# Patient Record
Sex: FEMALE | Race: WHITE | URBAN - METROPOLITAN AREA
[De-identification: names, ages, dates, MRNs, and addresses within clinical notes are randomized per-mention and may not be internally consistent; named-entity substitution may affect disease eponyms.]

---

## 2017-05-30 ENCOUNTER — OFFICE VISIT (OUTPATIENT)
Dept: SURGERY | Facility: CLINIC | Age: 72
End: 2017-05-30

## 2017-05-30 VITALS
TEMPERATURE: 98 F | SYSTOLIC BLOOD PRESSURE: 154 MMHG | DIASTOLIC BLOOD PRESSURE: 61 MMHG | WEIGHT: 237.6 LBS | OXYGEN SATURATION: 99 % | HEIGHT: 61 IN | HEART RATE: 68 BPM | BODY MASS INDEX: 44.86 KG/M2

## 2017-05-30 DIAGNOSIS — K62.89 HYPERTROPHY, ANAL PAPILLAE: Primary | ICD-10-CM

## 2017-05-30 DIAGNOSIS — K64.8 INTERNAL HEMORRHOIDS: ICD-10-CM

## 2017-05-30 ASSESSMENT — PAIN SCALES - GENERAL: PAINLEVEL: NO PAIN (0)

## 2017-05-30 NOTE — MR AVS SNAPSHOT
"              After Visit Summary   5/30/2017    Claudette Farrar    MRN: [id number]           Patient Information     Date Of Birth          1945        Visit Information        Provider Department      5/30/2017 10:00 AM eJ Vargas MD [facility] Colon and Rectal Surgery        Today's Diagnoses     Hypertrophy, anal papillae    -  1    Internal hemorrhoids           Follow-ups after your visit        Who to contact     Please call your clinic at [phone] to:    Ask questions about your health    Make or cancel appointments    Discuss your medicines    Learn about your test results    Speak to your doctor   If you have compliments or concerns about an experience at your clinic, or if you wish to file a complaint, please contact [facility] Patient Relations at [phone] or email us at [email or web address]         Additional Information About Your Visit        MyChart Information     [employer] gives you secure access to your electronic health record. If you see a primary care provider, you can also send messages to your care team and make appointments. If you have questions, please call your primary care clinic.  If you do not have a primary care provider, please call [phone] and they will assist you.      [employer] is an electronic gateway that provides easy, online access to your medical records. With [employer], you can request a clinic appointment, read your test results, renew a prescription or communicate with your care team.     To access your existing account, please contact your [facility] or call [phone] for assistance.        Care EveryWhere ID     This is your Care EveryWhere ID. This could be used by other organizations to access your [place] medical records  [id number]        Your Vitals Were     Pulse Temperature Height Pulse Oximetry BMI (Body Mass Index)       68 98  F (36.7  C) (Oral) 1.549 m (5' 1\") 99% 44.89 " "kg/m2        Blood Pressure from Last 3 Encounters:   No data found for BP    Weight from Last 3 Encounters:   No data found for Wt              Today, you had the following     No orders found for display       Primary Care Provider Office Phone # Fax #    Deonte Gooden [phone]       [facility] [address] [facility] [address]        Thank you!     Thank you for choosing [facility] COLON AND RECTAL SURGERY  for your care. Our goal is always to provide you with excellent care. Hearing back from our patients is one way we can continue to improve our services. Please take a few minutes to complete the written survey that you may receive in the mail after your visit with us. Thank you!             Your Updated Medication List - Protect others around you: Learn how to safely use, store and throw away your medicines at www.disposemymeds.org.          This list is accurate as of: 5/30/17 11:59 PM.  Always use your most recent med list.                   Brand Name Dispense Instructions for use    bisacodyl 5 MG EC tablet    DULCOLAX     Take 5 mg by mouth daily as needed for constipation       blood glucose monitoring lancets      1 each by In Vitro route Use to test blood sugar 3 times daily or as directed.       DULOXETINE HCL PO      Take 60 mg by mouth       ESOMEPRAZOLE MAGNESIUM PO      Take 40 mg by mouth       FERROUS GLUCONATE PO      Take 325 mg by mouth       fluticasone 50 MCG/ACT spray    FLONASE     Spray 2 sprays into both nostrils daily       FOLIC ACID PO      Take 400 mcg by mouth daily       FUROSEMIDE PO      Take 20 mg by mouth       hydrocortisone 2.5 % cream      Apply topically 2 times daily       insulin glargine 100 UNIT/ML injection    LANTUS     Inject Subcutaneous At Bedtime       LOSARTAN POTASSIUM PO      Take 50 mg by mouth       magnesium citrate solution     296 mL    Take 296 mLs by mouth See Admin Instructions Refer to \"Getting Ready for a " "Colonoscopy\" patient handout.       METHOTREXATE PO      Take 2.5 mg by mouth       metoprolol-hydrochlorothiazide 25-12.5 MG Tb24 per tablet    DUTOPROL     Take 1 tablet by mouth daily       NONFORMULARY          oxybutynin 10 MG 24 hr tablet    DITROPAN-XL     Take 10 mg by mouth daily       oxyCODONE-acetaminophen 5-325 MG per tablet    PERCOCET     Take by mouth every 4 hours as needed for moderate to severe pain       polyethylene glycol powder    MIRALAX/GLYCOLAX     Take 1 capful by mouth daily       potassium gluconate 2.5 MEQ Tabs          PRAVASTATIN SODIUM PO      Take 80 mg by mouth       SUMATRIPTAN SUCCINATE PO      Take 25 mg by mouth       tofacitinib 5 MG tablet    XELJANZ     Take 5 mg by mouth 2 times daily       * UNABLE TO FIND      MEDICATION NAME: Pen needle, diabetic 31 gauge x 5/16\"       * UNABLE TO FIND      MEDICATION NAME: Truetest test strips       * UNABLE TO FIND      MEDICATION NAME: Blood-glucose meter       vitamin D 67653 UNIT capsule    ERGOCALCIFEROL     Take 50,000 Units by mouth       * Notice:  This list has 3 medication(s) that are the same as other medications prescribed for you. Read the directions carefully, and ask your doctor or other care provider to review them with you.      "

## 2017-05-30 NOTE — NURSING NOTE
Xylocaine with epinephrine 1%.   Wasted 10ml.  Elmira Marks LPN  May 30, 2017

## 2017-05-30 NOTE — LETTER
"June 1, 2017    Bela Perdomo  [address] [facility] [address]    Dear Bela,    The pathology from your recent biopsy performed in clinic showed benign (non-cancerous) tissue.    If you should need further hemorrhoid banding, please call our office to schedule at [phone],  option 1.    Sincerely,    Sade Ureña, RN, BSN  RN Care Coordinator  Colon and Rectal Surgery Clinic  [facility]  [phone], option #3          Resulted Orders   Surgical pathology exam   Result Value Ref Range    Copath Report       Patient Name: BELA PERDOMO  MR#: [id number]  Specimen #: [id number]  Collected: 5/30/2017  Received: 5/30/2017  Reported: 5/31/2017 13:08  Ordering Phy(s): FLAKO BISHOP    For improved result formatting, select 'View Enhanced Report Format'  under Linked Documents section.    SPECIMEN(S):  Anal biopsy    FINAL DIAGNOSIS:  Anus, biopsy:  - Hypertrophied anal papilla with hyperkeratosis  - Negative for dysplasia and malignancy    I have personally reviewed all specimens and or slides, including the  listed special stains, and used them with my medical judgement to  determine the final diagnosis.    Electronically signed out by:    Ki Bradley M.D    CLINICAL HISTORY:    Hemorrhoids    GROSS:  The specimen is received in formalin with proper patient identification,  labeled \"anal papilla\", and consists of a 1.0 x 0.8 x 0.6 cm nodule-like  portion of pale tan-pink, slightly rubbery soft tissue with a 0.8 x 0.6  cm and slightly ragged and cauterized the margin of resection.  The  roula n is inked black, and the specimen is bisected, wrapped, and  entirely submitted in one cassette. (Dictated by: Zahra Marr 5/30/2017  11:49 AM)    MICROSCOPIC:   Microscopic exam was performed    CPT Codes:  A: 04839-SF6    TESTING LAB LOCATION:  [facility], [address]   [address] [phone]    COLLECTION " SITE:  Client: [facility]  Location: [facility] (B)

## 2017-05-30 NOTE — LETTER
2017       RE: Claudette Farrar  [address] [facility] [address]     Dear Colleague,    Thank you for referring your patient, Claudette Farrar, to the [facility] COLON AND RECTAL SURGERY at [facility]. Please see a copy of my visit note below.    Colon and Rectal Surgery Clinic Note      RE: Claudette Farrar  : 1945  ELROY: 2017      HISTORY OF PRESENT ILLNESS:  Claudette returns today for followup of her hemorrhoids and hypertrophied anal papilla.  She has bled to iron deficiency anemia.  I colonoscoped her on 2016 and found only a lymphoid follicle but no adenomas or other pathology.  I have banded Claudette in the past, and she has had an excellent response.  She is not sure if she has any further anal protrusion.  She more recently has had some anal bleeding that is relatively less in quantity than she had previously.  She also notes the prolapsing hypertrophied anal papilla.      PHYSICAL EXAMINATION:  Perianal examination demonstrates mixed prolapsed hemorrhoids.  There is an anterior prolapsed hypertrophied anal papilla.  Digital rectal examination shows no masses.  Anoscopy confirms enlarged internal hemorrhoids with associated mucosal prolapse, most prominent posteriorly and to the right.      I obtained written informed consent.  I anesthetized the base of the hypertrophied papilla with 1% Xylocaine with epinephrine.  I excised the papilla using electrocautery and oversewed the base with a 4-0 Vicryl figure-of-8 stitch.  I next applied a rubber band to the right posterolateral internal hemorrhoid.  I only had a moderate quantity of excess hemorrhoidal tissue, but given the span in the HAP excision, there was not really room to add an additional band comfortably.      PLAN:  Claudette will see how things go with today's banding and HAP removal.  If she has ongoing symptoms, she can follow up in clinic for repeat banding.      I answered all of Claudette's questions to her  "satisfaction.  She expressed understanding and agreement with the proposed plan.      cc:   Deonte Gooden MD   [facility]   [address] [place], [place] [id number]     For details of past medical history, surgical history, family history, medications, allergies, and review of systems, please see details below.    Medical history:  Past Medical History:   Diagnosis Date     Anemia      Bleeding hemorrhoid      Diabetes mellitus type 2 in nonobese (H)      GERD (gastroesophageal reflux disease)      Hypertension      Multiple thyroid nodules      Osteopenia      Rheumatoid arthritis (H)        Surgical history:  Past Surgical History:   Procedure Laterality Date     ANKLE SURGERY  2/1989    Fracture     DECOMPRESSION LUMBAR ONE LEVEL  12/2005    Infection after surgery     Foot surgery (joint)  11/2011     SINUS SURGERY  06/1993     Total Knee Replacement (right)  11/12/14       Family history:  Family History   Problem Relation Age of Onset     Anesthesia Reaction Paternal Grandmother      Colon Polyps Father      Crohn Disease No family hx of      Ulcerative Colitis Father      Colon Cancer No family hx of      Other Cancer Paternal Aunt      Other - See Comments Father      Ileostomy        Medications:  Current Outpatient Prescriptions   Medication Sig Dispense Refill     magnesium citrate solution Take 296 mLs by mouth See Admin Instructions Refer to \"Getting Ready for a Colonoscopy\" patient handout. 296 mL 0     UNABLE TO FIND MEDICATION NAME: Blood-glucose meter       bisacodyl (DULCOLAX) 5 MG EC tablet Take 5 mg by mouth daily as needed for constipation       DULOXETINE HCL PO Take 60 mg by mouth       vitamin D (ERGOCALCIFEROL) 20858 UNIT capsule Take 50,000 Units by mouth       ESOMEPRAZOLE MAGNESIUM PO Take 40 mg by mouth       FERROUS GLUCONATE PO Take 325 mg by mouth       fluticasone (FLONASE) 50 MCG/ACT nasal spray Spray 2 sprays into both nostrils daily       FOLIC ACID PO Take 400 mcg by " "mouth daily       FUROSEMIDE PO Take 20 mg by mouth       hydrocortisone 2.5 % cream Apply topically 2 times daily       insulin glargine (LANTUS) 100 UNIT/ML vial Inject Subcutaneous At Bedtime       blood glucose monitoring (ULTRA THIN 30G) lancets 1 each by In Vitro route Use to test blood sugar 3 times daily or as directed.       LOSARTAN POTASSIUM PO Take 50 mg by mouth       Methotrexate Sodium (METHOTREXATE PO) Take 2.5 mg by mouth       metoprolol-hydrochlorothiazide (DUTOPROL) 25-12.5 MG TB24 per tablet Take 1 tablet by mouth daily       polyethylene glycol (MIRALAX/GLYCOLAX) powder Take 1 capful by mouth daily       oxybutynin (DITROPAN-XL) 10 MG 24 hr tablet Take 10 mg by mouth daily       oxyCODONE-acetaminophen (PERCOCET) 5-325 MG per tablet Take by mouth every 4 hours as needed for moderate to severe pain       NONFORMULARY        UNABLE TO FIND MEDICATION NAME: Pen needle, diabetic 31 gauge x 5/16\"       potassium gluconate 2.5 MEQ TABS        PRAVASTATIN SODIUM PO Take 80 mg by mouth       SUMATRIPTAN SUCCINATE PO Take 25 mg by mouth       UNABLE TO FIND MEDICATION NAME: Truetest test strips       tofacitinib (XELJANZ) 5 MG Take 5 mg by mouth 2 times daily     Allergies:  The patientis allergic to metformin and sulfa drugs.    Social history:  Social History   Substance Use Topics     Smoking status: Never Smoker     Smokeless tobacco: Not on file     Alcohol use 0.0 oz/week     0 Standard drinks or equivalent per week      Comment: Average one drink per month     Marital status: .    Review of Systems:  Nursing Notes:   Elmira Marks LPN  5/30/2017  9:59 AM  Signed  Chief Complaint   Patient presents with     Clinic Care Coordination - Follow-up     F/u per patient to discuss removing anal papilla.        Vitals:    05/30/17 0954   BP: 154/61   BP Location: Left arm   Patient Position: Chair   Cuff Size: Adult Large   Pulse: 68   Temp: 98  F (36.7  C)   TempSrc: Oral   SpO2: 99%   Weight: " "237 lb 9.6 oz   Height: 5' 1\"     Body mass index is 44.89 kg/(m^2).    MIRIAM Be MD   Professor and Chief  Division of Colon and Rectal Surgery  [facility]    Referring Provider:  [place] [address]     Primary Care Provider:  Deonte Gooden    "

## 2017-05-30 NOTE — NURSING NOTE
"Chief Complaint   Patient presents with     Clinic Care Coordination - Follow-up     F/u per patient to discuss removing anal papilla.        Vitals:    05/30/17 0954   BP: 154/61   BP Location: Left arm   Patient Position: Chair   Cuff Size: Adult Large   Pulse: 68   Temp: 98  F (36.7  C)   TempSrc: Oral   SpO2: 99%   Weight: 237 lb 9.6 oz   Height: 5' 1\"       Body mass index is 44.89 kg/(m^2).    You DORADO LPN                        "

## 2017-05-31 LAB — COPATH REPORT: NORMAL

## 2017-06-01 ENCOUNTER — TELEPHONE (OUTPATIENT)
Dept: SURGERY | Facility: CLINIC | Age: 72
End: 2017-06-01

## 2017-06-01 NOTE — TELEPHONE ENCOUNTER
"Voice mail Claudette, calling for Dr. Vargas.  She has questions and problems post papillae removal yesterday.    Will route to Dr. Alicia Stuart's clinic nurse.    I called Claudette back, she reports procedure yesterday.  Today \"swollen ball outside\", irritated but not significant pain.  Want's to make sure she shouldn't be concerned?      "

## 2017-06-01 NOTE — TELEPHONE ENCOUNTER
Contacted patient and she reports feeling swelling in anal area.  Instructed to use warm squirt bottle and frequent sitz baths.  Denies, pain, fever, or chills.  Will call back with any further issues.

## 2017-06-02 ENCOUNTER — CARE COORDINATION (OUTPATIENT)
Dept: SURGERY | Facility: CLINIC | Age: 72
End: 2017-06-02

## 2017-07-18 ENCOUNTER — OFFICE VISIT (OUTPATIENT)
Dept: SURGERY | Facility: CLINIC | Age: 72
End: 2017-07-18

## 2017-07-18 VITALS — WEIGHT: 238.6 LBS | TEMPERATURE: 98.6 F | OXYGEN SATURATION: 97 % | BODY MASS INDEX: 45.05 KG/M2 | HEIGHT: 61 IN

## 2017-07-18 DIAGNOSIS — K64.8 INTERNAL HEMORRHOIDS: Primary | ICD-10-CM

## 2017-07-18 RX ORDER — BUPROPION HYDROCHLORIDE 150 MG/1
TABLET ORAL
COMMUNITY
Start: 2017-06-27

## 2017-07-18 ASSESSMENT — PAIN SCALES - GENERAL: PAINLEVEL: NO PAIN (0)

## 2017-07-18 NOTE — NURSING NOTE
"Chief Complaint   Patient presents with     Clinic Care Coordination - Initial     Clinical f/u for hemorrhoid banding.        Vitals:    07/18/17 1005   BP Location: Left arm   Patient Position: Chair   Cuff Size: Adult Large   Temp: 98.6  F (37  C)   TempSrc: Oral   SpO2: 97%   Weight: 238 lb 9.6 oz   Height: 5' 1\"       Body mass index is 45.08 kg/(m^2).    You DORADO LPN                        "

## 2017-07-18 NOTE — MR AVS SNAPSHOT
"              After Visit Summary   7/18/2017    Claudette Farrar    MRN: [id number]           Patient Information     Date Of Birth          1945        Visit Information        Provider Department      7/18/2017 10:00 AM Je Vargas MD [facility] Colon and Rectal Surgery        Today's Diagnoses     Internal hemorrhoids    -  1       Follow-ups after your visit        Who to contact     Please call your clinic at [phone] to:    Ask questions about your health    Make or cancel appointments    Discuss your medicines    Learn about your test results    Speak to your doctor   If you have compliments or concerns about an experience at your clinic, or if you wish to file a complaint, please contact [facility] Patient Relations at [phone] or email us at [email or web address]         Additional Information About Your Visit        MyChart Information     [employer] gives you secure access to your electronic health record. If you see a primary care provider, you can also send messages to your care team and make appointments. If you have questions, please call your primary care clinic.  If you do not have a primary care provider, please call [phone] and they will assist you.      [employer] is an electronic gateway that provides easy, online access to your medical records. With [employer], you can request a clinic appointment, read your test results, renew a prescription or communicate with your care team.     To access your existing account, please contact your [facility] or call [phone] for assistance.        Care EveryWhere ID     This is your Care EveryWhere ID. This could be used by other organizations to access your [place] medical records  [id number]        Your Vitals Were     Temperature Height Pulse Oximetry BMI (Body Mass Index)          98.6  F (37  C) (Oral) 1.549 m (5' 1\") 97% 45.08 kg/m2         Blood Pressure from Last 3 " Encounters:   No data found for BP    Weight from Last 3 Encounters:   No data found for Wt              Today, you had the following     No orders found for display       Primary Care Provider Office Phone # Fax #    Deonte Gooden [phone]       [facility] [address] [facility] [address]        Equal Access to Services     BRYNNCAROLINA THERON : Hadii aad ku hadasho Soomaali, waaxda luqadaha, qaybta kaalmada adeegyada, waxay idiin hayaan adetasneem khkennethheath lajosselin . So [facility] [phone].    ATENCIÓN: Si habla español, tiene a tate disposición servicios gratuitos de asistencia lingüística. Llame al [phone].    We comply with applicable federal civil rights laws and [place] laws. We do not discriminate on the basis of race, color, national origin, age, disability sex, sexual orientation or gender identity.            Thank you!     Thank you for choosing [facility] COLON AND RECTAL SURGERY  for your care. Our goal is always to provide you with excellent care. Hearing back from our patients is one way we can continue to improve our services. Please take a few minutes to complete the written survey that you may receive in the mail after your visit with us. Thank you!             Your Updated Medication List - Protect others around you: Learn how to safely use, store and throw away your medicines at www.disposemymeds.org.          This list is accurate as of: 7/18/17 11:59 PM.  Always use your most recent med list.                   Brand Name Dispense Instructions for use Diagnosis    bisacodyl 5 MG EC tablet    DULCOLAX     Take 5 mg by mouth daily as needed for constipation        blood glucose monitoring lancets      1 each by In Vitro route Use to test blood sugar 3 times daily or as directed.        buPROPion 150 MG 24 hr tablet    WELLBUTRIN XL     1-2 tablets po Q AM        DULOXETINE HCL PO      Take 60 mg by mouth        ESOMEPRAZOLE MAGNESIUM PO      Take 40 mg by mouth         "FERROUS GLUCONATE PO      Take 325 mg by mouth        fluticasone 50 MCG/ACT spray    FLONASE     Spray 2 sprays into both nostrils daily        FOLIC ACID PO      Take 400 mcg by mouth daily        FUROSEMIDE PO      Take 20 mg by mouth        hydrocortisone 2.5 % cream      Apply topically 2 times daily        insulin glargine 100 UNIT/ML injection    LANTUS     Inject Subcutaneous At Bedtime        LOSARTAN POTASSIUM PO      Take 50 mg by mouth        magnesium citrate solution     296 mL    Take 296 mLs by mouth See Admin Instructions Refer to \"Getting Ready for a Colonoscopy\" patient handout.    Other iron deficiency anemia, Rectal bleeding, Internal hemorrhoids       METHOTREXATE PO      Take 2.5 mg by mouth        metoprolol-hydrochlorothiazide 25-12.5 MG Tb24 per tablet    DUTOPROL     Take 1 tablet by mouth daily        NONFORMULARY           oxybutynin 10 MG 24 hr tablet    DITROPAN-XL     Take 10 mg by mouth daily        oxyCODONE-acetaminophen 5-325 MG per tablet    PERCOCET     Take by mouth every 4 hours as needed for moderate to severe pain        polyethylene glycol powder    MIRALAX/GLYCOLAX     Take 1 capful by mouth daily        potassium gluconate 2.5 MEQ Tabs           PRAVASTATIN SODIUM PO      Take 80 mg by mouth        SUMATRIPTAN SUCCINATE PO      Take 25 mg by mouth        tofacitinib 5 MG tablet    XELJANZ     Take 5 mg by mouth 2 times daily        * UNABLE TO FIND      MEDICATION NAME: Pen needle, diabetic 31 gauge x 5/16\"        * UNABLE TO FIND      MEDICATION NAME: Truetest test strips        * UNABLE TO FIND      MEDICATION NAME: Blood-glucose meter        vitamin D 94402 UNIT capsule    ERGOCALCIFEROL     Take 50,000 Units by mouth        * Notice:  This list has 3 medication(s) that are the same as other medications prescribed for you. Read the directions carefully, and ask your doctor or other care provider to review them with you.      "

## 2017-07-18 NOTE — LETTER
2017       RE: Claudette Farrar  [address] [facility] [address]     Dear Colleague,    Thank you for referring your patient, Claudette Farrar, to the [facility] COLON AND RECTAL SURGERY at [facility]. Please see a copy of my visit note below.    Colon and Rectal Surgery Clinic Note    RE: Claudette Farrar  : 1945  ELROY: 2017      Claudette returns to clinic today with recurrent rectal bleeding. I recently moved a hypertrophied anal papilla in Claudette recovered well from this. However, in the past few days she has had recurrent rather copious rectal bleeding. Her most recent colonoscopy was in 2016.    Physical examination shows mixed hemorrhoids. Digital rectal examination is normal. Anoscopy shows a scar from the papilla excision and moderately enlarged internal hemorrhoids, maximal posteriorly. I obtained written informed consent and performed suction band ligation posteriorly. Jose tolerated the procedure well.    We reviewed post-banding care, which Claudette is well familiar with. She'll follow-up in a month if she has persisting bleeding, or as needed in the future if her bleeding recurs.    Total time 15 minutes. Greater than half the time spent counseling.    For details of past medical history, surgical history, family history, medications, allergies, and review of systems, please see details below.    Medical history:  Past Medical History:   Diagnosis Date     Anemia      Bleeding hemorrhoid      Diabetes mellitus type 2 in nonobese (H)      GERD (gastroesophageal reflux disease)      Hypertension      Multiple thyroid nodules      Osteopenia      Rheumatoid arthritis (H)        Surgical history:  Past Surgical History:   Procedure Laterality Date     ANKLE SURGERY  1989    Fracture     DECOMPRESSION LUMBAR ONE LEVEL  2005    Infection after surgery     Foot surgery (joint)  2011     SINUS SURGERY  1993     Total Knee Replacement (right)  14       Family  "history:  Family History   Problem Relation Age of Onset     Anesthesia Reaction Paternal Grandmother      Colon Polyps Father      Crohn Disease No family hx of      Ulcerative Colitis Father      Colon Cancer No family hx of      Other Cancer Paternal Aunt      Other - See Comments Father      Ileostomy        Medications:  Current Outpatient Prescriptions   Medication Sig Dispense Refill     buPROPion (WELLBUTRIN XL) 150 MG 24 hr tablet 1-2 tablets po Q AM       magnesium citrate solution Take 296 mLs by mouth See Admin Instructions Refer to \"Getting Ready for a Colonoscopy\" patient handout. 296 mL 0     UNABLE TO FIND MEDICATION NAME: Blood-glucose meter       bisacodyl (DULCOLAX) 5 MG EC tablet Take 5 mg by mouth daily as needed for constipation       DULOXETINE HCL PO Take 60 mg by mouth       vitamin D (ERGOCALCIFEROL) 85246 UNIT capsule Take 50,000 Units by mouth       ESOMEPRAZOLE MAGNESIUM PO Take 40 mg by mouth       FERROUS GLUCONATE PO Take 325 mg by mouth       fluticasone (FLONASE) 50 MCG/ACT nasal spray Spray 2 sprays into both nostrils daily       FOLIC ACID PO Take 400 mcg by mouth daily       FUROSEMIDE PO Take 20 mg by mouth       hydrocortisone 2.5 % cream Apply topically 2 times daily       insulin glargine (LANTUS) 100 UNIT/ML vial Inject Subcutaneous At Bedtime       blood glucose monitoring (ULTRA THIN 30G) lancets 1 each by In Vitro route Use to test blood sugar 3 times daily or as directed.       LOSARTAN POTASSIUM PO Take 50 mg by mouth       Methotrexate Sodium (METHOTREXATE PO) Take 2.5 mg by mouth       metoprolol-hydrochlorothiazide (DUTOPROL) 25-12.5 MG TB24 per tablet Take 1 tablet by mouth daily       polyethylene glycol (MIRALAX/GLYCOLAX) powder Take 1 capful by mouth daily       oxybutynin (DITROPAN-XL) 10 MG 24 hr tablet Take 10 mg by mouth daily       oxyCODONE-acetaminophen (PERCOCET) 5-325 MG per tablet Take by mouth every 4 hours as needed for moderate to severe pain       " "NONFORMULARY        UNABLE TO FIND MEDICATION NAME: Pen needle, diabetic 31 gauge x 5/16\"       potassium gluconate 2.5 MEQ TABS        PRAVASTATIN SODIUM PO Take 80 mg by mouth       SUMATRIPTAN SUCCINATE PO Take 25 mg by mouth       UNABLE TO FIND MEDICATION NAME: Truetest test strips       tofacitinib (XELJANZ) 5 MG Take 5 mg by mouth 2 times daily       Allergies:  The patientis allergic to metformin and sulfa drugs.    Social history:  Social History   Substance Use Topics     Smoking status: Never Smoker     Smokeless tobacco: Not on file     Alcohol use 0.0 oz/week     0 Standard drinks or equivalent per week      Comment: Average one drink per month     Marital status: .    Review of Systems:  Nursing Notes:   Elmira Marks LPN  7/18/2017 10:10 AM  Signed  Chief Complaint   Patient presents with     Clinic Care Coordination - Initial     Clinical f/u for hemorrhoid banding.        Vitals:    07/18/17 1005   BP Location: Left arm   Patient Position: Chair   Cuff Size: Adult Large   Temp: 98.6  F (37  C)   TempSrc: Oral   SpO2: 97%   Weight: 238 lb 9.6 oz   Height: 5' 1\"       Body mass index is 45.08 kg/(m^2).    You DORADO LPN     Referring [place] [address]     Primary Care Provider:  Deonte Gooden    Again, thank you for allowing me to participate in the care of your patient.      Sincerely,    Je Vargas MD      "

## 2017-07-18 NOTE — PROGRESS NOTES
Colon and Rectal Surgery Clinic Note      RE: Claudette Farrar  : 1945  ELROY: 2017      Claudette returns to clinic today with recurrent rectal bleeding. I recently moved a hypertrophied anal papilla in Claudette recovered well from this. However, in the past few days she has had recurrent rather copious rectal bleeding. Her most recent colonoscopy was in .    Physical examination shows mixed hemorrhoids. Digital rectal examination is normal. Anoscopy shows a scar from the papilla excision and moderately enlarged internal hemorrhoids, maximal posteriorly. I obtained written informed consent and performed suction band ligation posteriorly. Jose tolerated the procedure well.    We reviewed post-banding care, which Claudette is well familiar with. She'll follow-up in a month if she has persisting bleeding, or as needed in the future if her bleeding recurs.    Total time 15 minutes. Greater than half the time spent counseling.      For details of past medical history, surgical history, family history, medications, allergies, and review of systems, please see details below.    Medical history:  Past Medical History:   Diagnosis Date     Anemia      Bleeding hemorrhoid      Diabetes mellitus type 2 in nonobese (H)      GERD (gastroesophageal reflux disease)      Hypertension      Multiple thyroid nodules      Osteopenia      Rheumatoid arthritis (H)        Surgical history:  Past Surgical History:   Procedure Laterality Date     ANKLE SURGERY  1989    Fracture     DECOMPRESSION LUMBAR ONE LEVEL  2005    Infection after surgery     Foot surgery (joint)  2011     SINUS SURGERY  1993     Total Knee Replacement (right)  16       Family history:  Family History   Problem Relation Age of Onset     Anesthesia Reaction Paternal Grandmother      Colon Polyps Father      Crohn Disease No family hx of      Ulcerative Colitis Father      Colon Cancer No family hx of      Other Cancer Paternal Aunt      Other - See  "Comments Father      Ileostomy        Medications:  Current Outpatient Prescriptions   Medication Sig Dispense Refill     buPROPion (WELLBUTRIN XL) 150 MG 24 hr tablet 1-2 tablets po Q AM       magnesium citrate solution Take 296 mLs by mouth See Admin Instructions Refer to \"Getting Ready for a Colonoscopy\" patient handout. 296 mL 0     UNABLE TO FIND MEDICATION NAME: Blood-glucose meter       bisacodyl (DULCOLAX) 5 MG EC tablet Take 5 mg by mouth daily as needed for constipation       DULOXETINE HCL PO Take 60 mg by mouth       vitamin D (ERGOCALCIFEROL) 49340 UNIT capsule Take 50,000 Units by mouth       ESOMEPRAZOLE MAGNESIUM PO Take 40 mg by mouth       FERROUS GLUCONATE PO Take 325 mg by mouth       fluticasone (FLONASE) 50 MCG/ACT nasal spray Spray 2 sprays into both nostrils daily       FOLIC ACID PO Take 400 mcg by mouth daily       FUROSEMIDE PO Take 20 mg by mouth       hydrocortisone 2.5 % cream Apply topically 2 times daily       insulin glargine (LANTUS) 100 UNIT/ML vial Inject Subcutaneous At Bedtime       blood glucose monitoring (ULTRA THIN 30G) lancets 1 each by In Vitro route Use to test blood sugar 3 times daily or as directed.       LOSARTAN POTASSIUM PO Take 50 mg by mouth       Methotrexate Sodium (METHOTREXATE PO) Take 2.5 mg by mouth       metoprolol-hydrochlorothiazide (DUTOPROL) 25-12.5 MG TB24 per tablet Take 1 tablet by mouth daily       polyethylene glycol (MIRALAX/GLYCOLAX) powder Take 1 capful by mouth daily       oxybutynin (DITROPAN-XL) 10 MG 24 hr tablet Take 10 mg by mouth daily       oxyCODONE-acetaminophen (PERCOCET) 5-325 MG per tablet Take by mouth every 4 hours as needed for moderate to severe pain       NONFORMULARY        UNABLE TO FIND MEDICATION NAME: Pen needle, diabetic 31 gauge x 5/16\"       potassium gluconate 2.5 MEQ TABS        PRAVASTATIN SODIUM PO Take 80 mg by mouth       SUMATRIPTAN SUCCINATE PO Take 25 mg by mouth       UNABLE TO FIND MEDICATION NAME: Truetest " "test strips       tofacitinib (XELJANZ) 5 MG Take 5 mg by mouth 2 times daily       Allergies:  The patientis allergic to metformin and sulfa drugs.    Social history:  Social History   Substance Use Topics     Smoking status: Never Smoker     Smokeless tobacco: Not on file     Alcohol use 0.0 oz/week     0 Standard drinks or equivalent per week      Comment: Average one drink per month     Marital status: .    Review of Systems:  Nursing Notes:   Elmira Marks LPN  7/18/2017 10:10 AM  Signed  Chief Complaint   Patient presents with     Clinic Care Coordination - Initial     Clinical f/u for hemorrhoid banding.        Vitals:    07/18/17 1005   BP Location: Left arm   Patient Position: Chair   Cuff Size: Adult Large   Temp: 98.6  F (37  C)   TempSrc: Oral   SpO2: 97%   Weight: 238 lb 9.6 oz   Height: 5' 1\"       Body mass index is 45.08 kg/(m^2).    MIRIAM Be MD   Professor and Chief  Division of Colon and Rectal Surgery  [facility]      Referring Provider:  [place] [address]     Primary Care Provider:  Deonte Gooden  "

## 2019-10-03 ENCOUNTER — HEALTH MAINTENANCE LETTER (OUTPATIENT)
Age: 74
End: 2019-10-03

## 2019-12-16 ENCOUNTER — HEALTH MAINTENANCE LETTER (OUTPATIENT)
Age: 74
End: 2019-12-16

## 2021-01-15 ENCOUNTER — HEALTH MAINTENANCE LETTER (OUTPATIENT)
Age: 76
End: 2021-01-15

## 2021-09-05 ENCOUNTER — HEALTH MAINTENANCE LETTER (OUTPATIENT)
Age: 76
End: 2021-09-05

## 2021-10-31 ENCOUNTER — HEALTH MAINTENANCE LETTER (OUTPATIENT)
Age: 76
End: 2021-10-31

## 2022-02-20 ENCOUNTER — HEALTH MAINTENANCE LETTER (OUTPATIENT)
Age: 77
End: 2022-02-20

## 2022-08-26 NOTE — PROGRESS NOTES
Colon and Rectal Surgery Clinic Note      RE: Claudette Farrar  : 1945  ELROY: 2017      HISTORY OF PRESENT ILLNESS:  Claudette returns today for followup of her hemorrhoids and hypertrophied anal papilla.  She has bled to iron deficiency anemia.  I colonoscoped her on 2016 and found only a lymphoid follicle but no adenomas or other pathology.  I have banded Claudette in the past, and she has had an excellent response.  She is not sure if she has any further anal protrusion.  She more recently has had some anal bleeding that is relatively less in quantity than she had previously.  She also notes the prolapsing hypertrophied anal papilla.      PHYSICAL EXAMINATION:  Perianal examination demonstrates mixed prolapsed hemorrhoids.  There is an anterior prolapsed hypertrophied anal papilla.  Digital rectal examination shows no masses.  Anoscopy confirms enlarged internal hemorrhoids with associated mucosal prolapse, most prominent posteriorly and to the right.      I obtained written informed consent.  I anesthetized the base of the hypertrophied papilla with 1% Xylocaine with epinephrine.  I excised the papilla using electrocautery and oversewed the base with a 4-0 Vicryl figure-of-8 stitch.  I next applied a rubber band to the right posterolateral internal hemorrhoid.  I only had a moderate quantity of excess hemorrhoidal tissue, but given the span in the HAP excision, there was not really room to add an additional band comfortably.      PLAN:  Claudette will see how things go with today's banding and HAP removal.  If she has ongoing symptoms, she can follow up in clinic for repeat banding.      I answered all of Claudette's questions to her satisfaction.  She expressed understanding and agreement with the proposed plan.      cc:   Deonte Gooden MD   [address]  [address]           For details of past medical history, surgical history, family history, medications, allergies, and  "review of systems, please see details below.    Medical history:  Past Medical History:   Diagnosis Date     Anemia      Bleeding hemorrhoid      Diabetes mellitus type 2 in nonobese (H)      GERD (gastroesophageal reflux disease)      Hypertension      Multiple thyroid nodules      Osteopenia      Rheumatoid arthritis (H)        Surgical history:  Past Surgical History:   Procedure Laterality Date     ANKLE SURGERY  2/1989    Fracture     DECOMPRESSION LUMBAR ONE LEVEL  12/2005    Infection after surgery     Foot surgery (joint)  11/2011     SINUS SURGERY  06/1993     Total Knee Replacement (right)  11/12/14       Family history:  Family History   Problem Relation Age of Onset     Anesthesia Reaction Paternal Grandmother      Colon Polyps Father      Crohn Disease No family hx of      Ulcerative Colitis Father      Colon Cancer No family hx of      Other Cancer Paternal Aunt      Other - See Comments Father      Ileostomy        Medications:  Current Outpatient Prescriptions   Medication Sig Dispense Refill     magnesium citrate solution Take 296 mLs by mouth See Admin Instructions Refer to \"Getting Ready for a Colonoscopy\" patient handout. 296 mL 0     UNABLE TO FIND MEDICATION NAME: Blood-glucose meter       bisacodyl (DULCOLAX) 5 MG EC tablet Take 5 mg by mouth daily as needed for constipation       DULOXETINE HCL PO Take 60 mg by mouth       vitamin D (ERGOCALCIFEROL) 58231 UNIT capsule Take 50,000 Units by mouth       ESOMEPRAZOLE MAGNESIUM PO Take 40 mg by mouth       FERROUS GLUCONATE PO Take 325 mg by mouth       fluticasone (FLONASE) 50 MCG/ACT nasal spray Spray 2 sprays into both nostrils daily       FOLIC ACID PO Take 400 mcg by mouth daily       FUROSEMIDE PO Take 20 mg by mouth       hydrocortisone 2.5 % cream Apply topically 2 times daily       insulin glargine (LANTUS) 100 UNIT/ML vial Inject Subcutaneous At Bedtime       blood glucose monitoring (ULTRA THIN 30G) lancets 1 each by In Vitro route Use " "to test blood sugar 3 times daily or as directed.       LOSARTAN POTASSIUM PO Take 50 mg by mouth       Methotrexate Sodium (METHOTREXATE PO) Take 2.5 mg by mouth       metoprolol-hydrochlorothiazide (DUTOPROL) 25-12.5 MG TB24 per tablet Take 1 tablet by mouth daily       polyethylene glycol (MIRALAX/GLYCOLAX) powder Take 1 capful by mouth daily       oxybutynin (DITROPAN-XL) 10 MG 24 hr tablet Take 10 mg by mouth daily       oxyCODONE-acetaminophen (PERCOCET) 5-325 MG per tablet Take by mouth every 4 hours as needed for moderate to severe pain       NONFORMULARY        UNABLE TO FIND MEDICATION NAME: Pen needle, diabetic 31 gauge x 5/16\"       potassium gluconate 2.5 MEQ TABS        PRAVASTATIN SODIUM PO Take 80 mg by mouth       SUMATRIPTAN SUCCINATE PO Take 25 mg by mouth       UNABLE TO FIND MEDICATION NAME: Truetest test strips       tofacitinib (XELJANZ) 5 MG Take 5 mg by mouth 2 times daily       Allergies:  The patientis allergic to metformin and sulfa drugs.    Social history:  Social History   Substance Use Topics     Smoking status: Never Smoker     Smokeless tobacco: Not on file     Alcohol use 0.0 oz/week     0 Standard drinks or equivalent per week      Comment: Average one drink per month     Marital status: .    Review of Systems:  Nursing Notes:   Elmira Marks LPN  5/30/2017  9:59 AM  Signed  Chief Complaint   Patient presents with     Clinic Care Coordination - Follow-up     F/u per patient to discuss removing anal papilla.        Vitals:    05/30/17 0954   BP: 154/61   BP Location: Left arm   Patient Position: Chair   Cuff Size: Adult Large   Pulse: 68   Temp: 98  F (36.7  C)   TempSrc: Oral   SpO2: 99%   Weight: 237 lb 9.6 oz   Height: 5' 1\"       Body mass index is 44.89 kg/(m^2).    MIRIAM Be MD   Professor and Chief  Division of Colon and Rectal Surgery  [facility]      Referring Provider:  Deonte Andino " MD [place]  [facility]  [address] [place] [address]     Primary Care Provider:  Deonte Gooden   Patient Specific Counseling (Will Not Stick From Patient To Patient): Advised to follow up with vein specialist

## 2022-10-23 ENCOUNTER — HEALTH MAINTENANCE LETTER (OUTPATIENT)
Age: 77
End: 2022-10-23

## 2023-04-02 ENCOUNTER — HEALTH MAINTENANCE LETTER (OUTPATIENT)
Age: 78
End: 2023-04-02

## 2023-04-29 ENCOUNTER — TELEPHONE (OUTPATIENT)
Dept: PHARMACY | Facility: HOSPITAL | Age: 78
End: 2023-04-29
Payer: COMMERCIAL

## 2023-04-29 NOTE — TELEPHONE ENCOUNTER
Payor: [employer] [place]  Reason For Call: Adherence  Medication: Atorvastatin 40 mg   Outcome: Unable to Reach    ---  Sharon Pozo PharmD, MICHELLE  Pharmacy Resident, [facility]

## 2023-08-17 ENCOUNTER — TELEPHONE (OUTPATIENT)
Dept: PRIMARY CARE | Facility: CLINIC | Age: 78
End: 2023-08-17
Payer: COMMERCIAL

## 2023-08-18 DIAGNOSIS — I10 HYPERTENSION, UNSPECIFIED TYPE: Primary | ICD-10-CM

## 2023-08-18 RX ORDER — AMLODIPINE BESYLATE 10 MG/1
10 TABLET ORAL DAILY
COMMUNITY
End: 2023-08-24 | Stop reason: SDUPTHER

## 2023-08-23 NOTE — TELEPHONE ENCOUNTER
Rx Refill Request Telephone Encounter    Name:  John Deutsch  :  [id number]  Medication Name:  Almodipine            Specific Pharmacy location:  [facility]  Date of last appointment:  2023  Date of next appointment:  10/9/2023  Best number to reach patient:  [phone]

## 2023-08-24 DIAGNOSIS — I10 PRIMARY HYPERTENSION: Primary | ICD-10-CM

## 2023-08-24 RX ORDER — AMLODIPINE BESYLATE 10 MG/1
10 TABLET ORAL DAILY
Qty: 30 TABLET | Refills: 0 | Status: SHIPPED | OUTPATIENT
Start: 2023-08-24 | End: 2023-10-04 | Stop reason: SDUPTHER

## 2023-10-04 DIAGNOSIS — I10 PRIMARY HYPERTENSION: ICD-10-CM

## 2023-10-04 RX ORDER — AMLODIPINE BESYLATE 10 MG/1
10 TABLET ORAL DAILY
Qty: 30 TABLET | Refills: 1 | Status: SHIPPED | OUTPATIENT
Start: 2023-10-04 | End: 2023-10-09 | Stop reason: SDUPTHER

## 2023-10-04 NOTE — PROGRESS NOTES
Formally seen by Dr. Cosme. Medication refill for amlodipine for 2 months. Patient will need to establish care with new provider.

## 2023-10-09 ENCOUNTER — OFFICE VISIT (OUTPATIENT)
Dept: PRIMARY CARE | Facility: CLINIC | Age: 78
End: 2023-10-09
Payer: COMMERCIAL

## 2023-10-09 VITALS
OXYGEN SATURATION: 96 % | DIASTOLIC BLOOD PRESSURE: 89 MMHG | HEIGHT: 59 IN | HEART RATE: 88 BPM | RESPIRATION RATE: 16 BRPM | SYSTOLIC BLOOD PRESSURE: 160 MMHG | BODY MASS INDEX: 30.99 KG/M2 | TEMPERATURE: 96.2 F | WEIGHT: 153.7 LBS

## 2023-10-09 DIAGNOSIS — Z76.89 ESTABLISHING CARE WITH NEW DOCTOR, ENCOUNTER FOR: ICD-10-CM

## 2023-10-09 DIAGNOSIS — K21.9 GASTROESOPHAGEAL REFLUX DISEASE WITHOUT ESOPHAGITIS: ICD-10-CM

## 2023-10-09 DIAGNOSIS — I10 PRIMARY HYPERTENSION: ICD-10-CM

## 2023-10-09 DIAGNOSIS — M25.552 BILATERAL HIP PAIN: Primary | ICD-10-CM

## 2023-10-09 DIAGNOSIS — M25.551 BILATERAL HIP PAIN: Primary | ICD-10-CM

## 2023-10-09 DIAGNOSIS — B35.1 NAIL FUNGUS: ICD-10-CM

## 2023-10-09 PROCEDURE — 3079F DIAST BP 80-89 MM HG: CPT

## 2023-10-09 PROCEDURE — 3077F SYST BP >= 140 MM HG: CPT

## 2023-10-09 PROCEDURE — 99213 OFFICE O/P EST LOW 20 MIN: CPT

## 2023-10-09 PROCEDURE — 1036F TOBACCO NON-USER: CPT

## 2023-10-09 PROCEDURE — 1159F MED LIST DOCD IN RCRD: CPT

## 2023-10-09 PROCEDURE — 1125F AMNT PAIN NOTED PAIN PRSNT: CPT

## 2023-10-09 RX ORDER — PANTOPRAZOLE SODIUM 40 MG/1
40 TABLET, DELAYED RELEASE ORAL DAILY
Qty: 90 TABLET | Refills: 3 | Status: SHIPPED | OUTPATIENT
Start: 2023-10-09 | End: 2024-10-08

## 2023-10-09 RX ORDER — ACETAMINOPHEN 500 MG
TABLET ORAL
COMMUNITY
Start: 2017-10-04

## 2023-10-09 RX ORDER — AMLODIPINE BESYLATE 10 MG/1
10 TABLET ORAL DAILY
Qty: 30 TABLET | Refills: 1 | Status: SHIPPED | OUTPATIENT
Start: 2023-10-09 | End: 2023-11-27 | Stop reason: SDUPTHER

## 2023-10-09 RX ORDER — PANTOPRAZOLE SODIUM 40 MG/1
40 TABLET, DELAYED RELEASE ORAL DAILY
COMMUNITY
End: 2023-10-09 | Stop reason: SDUPTHER

## 2023-10-09 RX ORDER — CETIRIZINE HYDROCHLORIDE 10 MG/1
1 TABLET ORAL DAILY
COMMUNITY

## 2023-10-09 RX ORDER — DICLOFENAC SODIUM 10 MG/G
4 GEL TOPICAL 4 TIMES DAILY
Qty: 100 G | Refills: 1 | Status: SHIPPED | OUTPATIENT
Start: 2023-10-09

## 2023-10-09 ASSESSMENT — ENCOUNTER SYMPTOMS
CONFUSION: 0
SLEEP DISTURBANCE: 0
EYE DISCHARGE: 0
FREQUENCY: 0
JOINT SWELLING: 0
UNEXPECTED WEIGHT CHANGE: 0
ABDOMINAL PAIN: 0
HEMATURIA: 0
NAUSEA: 0
DYSURIA: 0
SHORTNESS OF BREATH: 0
VOMITING: 0
DIZZINESS: 0
RHINORRHEA: 0
WOUND: 0
CHILLS: 0
MYALGIAS: 0
APPETITE CHANGE: 0
PALPITATIONS: 0
CONSTIPATION: 0
EYE ITCHING: 0
LIGHT-HEADEDNESS: 0
FEVER: 0
WHEEZING: 0
DIARRHEA: 0
COUGH: 0
HEADACHES: 0
BACK PAIN: 1

## 2023-10-09 ASSESSMENT — PAIN SCALES - GENERAL: PAINLEVEL: 4

## 2023-10-09 NOTE — PATIENT INSTRUCTIONS
It was so great to see you today John Deutsch  . Today we discussed:    -Refilled medications  -Referral to physical therapy and provided topical for joints  -Referral to Cardiology (heart doctor) call number below  -Referral to Podiatry for nail trim and treatment of fungus  -Follow up in 1 month to check blood pressure    Call [phone]  For Care if you need to schedule appointment with specialist or images.    Follow up in       You were see by:    Dr. Reba Carlos D.O.  [facility]   Phone: [phone]

## 2023-10-09 NOTE — PROGRESS NOTES
Subjective   Patient ID: John Deutsch is a 78 y.o. female who presents for Establish Care (Physical.), Back Pain, and Hip Pain.    #Chronic Back Pain  -Daily, sometimes in the morning , achy  -tylenol    #BL chronic Hip Pain  -2yrs with walking  -achy, pain to back of thighs  -sometimes wake    OBSTETRIC HISTORY:  G/P:   Abortions: one  Vaginal Delivery: none   Section: x2    GYNECOLOGICAL HISTORY:  Menarche: Menopause   LMP: Early 40s  Intermenstrual bleeding: no  Pelvic pain: none  Breast/Ovarian/Uterine CA: denies, two sisters with breast cancer  Last PAP:   History of Abnormal PAP: denies      PAST MEDICAL HISTORY:  -Crohns, HTN, GERD     PAST SURGICAL HISTORY:  - tubal ligation, colon resection    FAMILY HISTORY:  - DM, HTN    SOCIAL HISTORY:  Tobacco: denies  ETOH: denies  Drug: denies  Sexual hx: denies  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   HENT:  Negative for congestion, ear discharge, rhinorrhea and sneezing.    Eyes:  Negative for discharge, itching and visual disturbance.   Respiratory:  Negative for cough, shortness of breath and wheezing.    Cardiovascular:  Negative for chest pain, palpitations and leg swelling.   Gastrointestinal:  Negative for abdominal pain, constipation, diarrhea, nausea and vomiting.   Endocrine: Negative for cold intolerance and heat intolerance.   Genitourinary:  Negative for dysuria, frequency, hematuria and urgency.   Musculoskeletal:  Positive for back pain. Negative for joint swelling and myalgias.   Skin:  Negative for rash and wound.   Neurological:  Negative for dizziness, light-headedness and headaches.   Psychiatric/Behavioral:  Negative for confusion, sleep disturbance and suicidal ideas.        Objective   /89 (BP Location: Left arm, Patient Position: Sitting, BP Cuff Size: Adult)    "Pulse 88   Temp 35.7 °C (96.2 °F) (Temporal)   Resp 16   Ht 1.499 m (4' 11\")   Wt 69.7 kg (153 lb 11.2 oz)   SpO2 96%   BMI 31.04 kg/m²     Physical Exam  Constitutional:       General: She is not in acute distress.     Appearance: Normal appearance.   HENT:      Head: Normocephalic and atraumatic.      Right Ear: Tympanic membrane and ear canal normal.      Left Ear: Tympanic membrane and ear canal normal.      Nose: Nose normal. No congestion or rhinorrhea.      Mouth/Throat:      Mouth: Mucous membranes are moist.      Pharynx: No oropharyngeal exudate or posterior oropharyngeal erythema.   Eyes:      Extraocular Movements: Extraocular movements intact.      Conjunctiva/sclera: Conjunctivae normal.   Cardiovascular:      Rate and Rhythm: Normal rate and regular rhythm.      Heart sounds: Normal heart sounds. No murmur heard.  Pulmonary:      Effort: Pulmonary effort is normal.      Breath sounds: Normal breath sounds. No wheezing, rhonchi or rales.   Abdominal:      General: Bowel sounds are normal. There is no distension.      Palpations: Abdomen is soft.      Tenderness: There is no abdominal tenderness. There is no rebound.   Musculoskeletal:         General: Normal range of motion.      Cervical back: Normal range of motion and neck supple.      Right lower leg: No edema.      Left lower leg: No edema.   Skin:     General: Skin is warm and dry.      Capillary Refill: Capillary refill takes 2 to 3 seconds.   Neurological:      General: No focal deficit present.      Mental Status: She is alert. Mental status is at baseline.   Psychiatric:         Mood and Affect: Mood normal.         Behavior: Behavior normal.         Assessment/Plan   John Deutsch is a 78 year old female with a past medical history of HTN, GERD, HFpEF (65-70% EF 12/21), and orthopnea presents to the clinic to establish care with new physician and concerns for OA pain in Hips. Patient reports moderate pain that is intermittent and worse " at the end of the day. Of note patient was admitted in Feb 2023 for Chest pain and orthopnea. She was provided a referral to Cardiology and never went. Provided another referral. Detailed plan below:   Diagnoses and all orders for this visit:  Bilateral hip pain  -     diclofenac sodium (Voltaren) 1 % gel gel; Apply 1 Application topically 4 times a day.  -     Referral to Physical Therapy; Future  Primary hypertension  -     Referral to Cardiology; Future  -     amLODIPine (Norvasc) 10 mg tablet; Take 1 tablet (10 mg) by mouth once daily.  Gastroesophageal reflux disease without esophagitis  -     pantoprazole (ProtoNix) 40 mg EC tablet; Take 1 tablet (40 mg) by mouth once daily.  Nail fungus  -     Referral to Podiatry; Future  Establishing care with new doctor, encounter for  Other orders  -     Follow Up In Primary Care; Future    RTC in 1 month for BP check.    Discussed with Dr. Daniel Carlos,

## 2023-10-11 NOTE — PROGRESS NOTES
I reviewed the resident's documentation and discussed the patient with the resident. I agree with the resident's medical decision making as documented in the note.     Dianna Sanchez MD

## 2023-11-12 PROBLEM — K59.00 CONSTIPATION: Status: ACTIVE | Noted: 2023-11-12

## 2023-11-12 PROBLEM — Z98.42 CATARACT EXTRACTION STATUS OF EYE, LEFT: Status: ACTIVE | Noted: 2023-11-12

## 2023-11-12 PROBLEM — M45.9 ANKYLOSING SPONDYLITIS (MULTI): Status: ACTIVE | Noted: 2023-11-12

## 2023-11-12 PROBLEM — A63.0 CONDYLOMA: Status: ACTIVE | Noted: 2023-11-12

## 2023-11-12 PROBLEM — B07.9 VIRAL WART, UNSPECIFIED: Status: ACTIVE | Noted: 2018-05-24

## 2023-11-12 PROBLEM — E87.6 HYPOKALEMIA: Status: ACTIVE | Noted: 2023-11-12

## 2023-11-12 PROBLEM — H25.812 COMBINED FORM OF AGE-RELATED CATARACT, LEFT EYE: Status: ACTIVE | Noted: 2023-11-12

## 2023-11-12 PROBLEM — M54.12 CERVICAL RADICULOPATHY: Status: ACTIVE | Noted: 2023-11-12

## 2023-11-12 PROBLEM — H25.13 CATARACT, NUCLEAR SCLEROTIC, BOTH EYES: Status: ACTIVE | Noted: 2023-11-12

## 2023-11-12 PROBLEM — R52 BURNING PAIN: Status: ACTIVE | Noted: 2023-11-12

## 2023-11-12 PROBLEM — M77.8 RIGHT HAND TENDONITIS: Status: ACTIVE | Noted: 2023-11-12

## 2023-11-12 PROBLEM — H91.90 HEARING LOSS: Status: ACTIVE | Noted: 2023-11-12

## 2023-11-12 PROBLEM — R22.0 SWOLLEN LIP: Status: ACTIVE | Noted: 2023-11-12

## 2023-11-12 PROBLEM — S02.2XXG CLOSED FRACTURE OF NASAL BONE WITH DELAYED HEALING: Status: ACTIVE | Noted: 2023-11-12

## 2023-11-12 PROBLEM — E66.9 OBESITY (BMI 30.0-34.9): Status: ACTIVE | Noted: 2023-11-12

## 2023-11-12 PROBLEM — K50.90 CROHN'S DISEASE (MULTI): Status: ACTIVE | Noted: 2023-11-12

## 2023-11-12 PROBLEM — M54.2 NECK PAIN: Status: ACTIVE | Noted: 2023-11-12

## 2023-11-12 PROBLEM — Z86.2 HISTORY OF ANEMIA: Status: ACTIVE | Noted: 2023-11-12

## 2023-11-12 PROBLEM — S02.5XXA BROKEN TOOTH: Status: ACTIVE | Noted: 2023-11-12

## 2023-11-12 PROBLEM — I83.899 VARICOSE VEINS WITH SWELLING: Status: ACTIVE | Noted: 2023-11-12

## 2023-11-12 PROBLEM — H04.123 DRY EYES, BILATERAL: Status: ACTIVE | Noted: 2023-11-12

## 2023-11-12 PROBLEM — K21.9 GERD (GASTROESOPHAGEAL REFLUX DISEASE): Status: ACTIVE | Noted: 2023-11-12

## 2023-11-12 PROBLEM — M25.559 ARTHRALGIA OF HIP: Status: ACTIVE | Noted: 2023-11-12

## 2023-11-12 PROBLEM — F34.1 DYSTHYMIA: Status: ACTIVE | Noted: 2023-11-12

## 2023-11-12 PROBLEM — R51.9 HEADACHE: Status: ACTIVE | Noted: 2023-11-12

## 2023-11-12 PROBLEM — L65.9 NONSCARRING HAIR LOSS, UNSPECIFIED: Status: ACTIVE | Noted: 2018-05-24

## 2023-11-12 PROBLEM — K63.8219 SMALL INTESTINAL BACTERIAL OVERGROWTH: Status: ACTIVE | Noted: 2023-11-12

## 2023-11-12 PROBLEM — R06.02 SHORTNESS OF BREATH: Status: ACTIVE | Noted: 2023-11-12

## 2023-11-12 PROBLEM — H35.363 RETINAL DRUSEN OF BOTH EYES: Status: ACTIVE | Noted: 2023-11-12

## 2023-11-12 PROBLEM — J30.2 SEASONAL ALLERGIC RHINITIS: Status: ACTIVE | Noted: 2023-11-12

## 2023-11-12 PROBLEM — R06.02 SHORTNESS OF BREATH ON EXERTION: Status: ACTIVE | Noted: 2023-11-12

## 2023-11-12 PROBLEM — M79.646 FINGER PAIN: Status: ACTIVE | Noted: 2023-11-12

## 2023-11-12 PROBLEM — R22.0 FACIAL SWELLING: Status: ACTIVE | Noted: 2023-11-12

## 2023-11-12 PROBLEM — L66.1 LICHEN PLANOPILARIS: Status: ACTIVE | Noted: 2018-05-24

## 2023-11-12 PROBLEM — R60.0 BILATERAL LOWER EXTREMITY EDEMA: Status: ACTIVE | Noted: 2023-11-12

## 2023-11-12 PROBLEM — M85.80 OSTEOPENIA: Status: ACTIVE | Noted: 2023-11-12

## 2023-11-12 PROBLEM — M19.90 INFLAMMATORY ARTHRITIS: Status: ACTIVE | Noted: 2023-11-12

## 2023-11-12 PROBLEM — L21.0 SEBORRHEA CAPITIS: Status: ACTIVE | Noted: 2023-11-12

## 2023-11-12 PROBLEM — G56.03 CARPAL TUNNEL SYNDROME, BILATERAL: Status: ACTIVE | Noted: 2023-11-12

## 2023-11-12 PROBLEM — R09.89 PHLEGM IN THROAT: Status: ACTIVE | Noted: 2023-11-12

## 2023-11-12 PROBLEM — M79.89 SWELLING OF LOWER EXTREMITY: Status: ACTIVE | Noted: 2019-07-05

## 2023-11-12 PROBLEM — T14.8XXA MUSCLE STRAIN: Status: ACTIVE | Noted: 2023-11-12

## 2023-11-12 PROBLEM — R06.01 SLEEPS IN SITTING POSITION DUE TO ORTHOPNEA: Status: ACTIVE | Noted: 2023-11-12

## 2023-11-12 PROBLEM — R43.8 HYPOSMIA: Status: ACTIVE | Noted: 2023-11-12

## 2023-11-12 PROBLEM — Z90.49 STATUS POST SMALL BOWEL RESECTION: Status: ACTIVE | Noted: 2021-12-27

## 2023-11-12 PROBLEM — S61.219A FINGER LACERATION: Status: ACTIVE | Noted: 2023-11-12

## 2023-11-12 PROBLEM — R07.89 CHEST WALL PAIN: Status: ACTIVE | Noted: 2023-11-12

## 2023-11-12 PROBLEM — R13.10 DYSPHAGIA: Status: ACTIVE | Noted: 2023-11-12

## 2023-11-12 PROBLEM — S60.111A SUBUNGUAL HEMATOMA OF RIGHT THUMB: Status: ACTIVE | Noted: 2023-11-12

## 2023-11-12 PROBLEM — H35.3130 AGE-RELATED MACULAR DEGENERATION, DRY, BOTH EYES: Status: ACTIVE | Noted: 2023-11-12

## 2023-11-12 PROBLEM — I10 HYPERTENSION: Status: ACTIVE | Noted: 2021-12-27

## 2023-11-12 PROBLEM — R12 HEARTBURN: Status: ACTIVE | Noted: 2023-11-12

## 2023-11-12 PROBLEM — R53.83 FATIGUE: Status: ACTIVE | Noted: 2023-11-12

## 2023-11-12 PROBLEM — D25.9 LEIOMYOMA OF UTERUS: Status: ACTIVE | Noted: 2023-11-12

## 2023-11-12 PROBLEM — I11.0 HYPERTENSIVE HEART DISEASE WITH HEART FAILURE (MULTI): Status: ACTIVE | Noted: 2023-11-12

## 2023-11-12 PROBLEM — E88.810 METABOLIC SYNDROME: Status: ACTIVE | Noted: 2023-11-12

## 2023-11-12 PROBLEM — R10.9 ABDOMINAL PAIN: Status: ACTIVE | Noted: 2023-11-12

## 2023-11-12 PROBLEM — E66.811 OBESITY (BMI 30.0-34.9): Status: ACTIVE | Noted: 2023-11-12

## 2023-11-12 PROBLEM — K08.89 PAIN, DENTAL: Status: ACTIVE | Noted: 2023-11-12

## 2023-11-12 PROBLEM — L50.8 CHRONIC URTICARIA: Status: ACTIVE | Noted: 2023-11-12

## 2023-11-12 PROBLEM — L65.9 ALOPECIA: Status: ACTIVE | Noted: 2023-11-12

## 2023-11-12 PROBLEM — G56.20 ULNAR NEUROPATHY AT ELBOW: Status: ACTIVE | Noted: 2023-11-12

## 2023-11-12 PROBLEM — R21 RASH: Status: ACTIVE | Noted: 2023-11-12

## 2023-11-12 PROBLEM — M54.50 LOW BACK PAIN WITH RADIATION: Status: ACTIVE | Noted: 2023-11-12

## 2023-11-12 PROBLEM — L66.10 LICHEN PLANOPILARIS: Status: ACTIVE | Noted: 2018-05-24

## 2023-11-12 PROBLEM — R10.2 FEMALE PELVIC PAIN: Status: ACTIVE | Noted: 2023-11-12

## 2023-11-12 PROBLEM — R07.89 TIGHTNESS IN CHEST: Status: ACTIVE | Noted: 2023-11-12

## 2023-11-12 PROBLEM — H35.363 DRUSEN OF MACULA OF BOTH EYES: Status: ACTIVE | Noted: 2023-11-12

## 2023-11-12 PROBLEM — T78.40XA ALLERGIC REACTION: Status: ACTIVE | Noted: 2023-11-12

## 2023-11-12 PROBLEM — N84.0 UTERINE POLYP: Status: ACTIVE | Noted: 2023-11-12

## 2023-11-12 PROBLEM — K62.4 ANAL STRICTURE: Status: ACTIVE | Noted: 2023-11-12

## 2023-11-12 PROBLEM — B07.9 VERRUCAE VULGARIS: Status: ACTIVE | Noted: 2023-11-12

## 2023-11-12 PROBLEM — G62.9 NEUROPATHY: Status: ACTIVE | Noted: 2023-11-12

## 2023-11-12 PROBLEM — E55.9 VITAMIN D DEFICIENCY: Status: ACTIVE | Noted: 2023-11-12
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
## 2023-11-12 RX ORDER — GUAIFENESIN 600 MG/1
1 TABLET, EXTENDED RELEASE ORAL 2 TIMES DAILY PRN
COMMUNITY
Start: 2022-03-03 | End: 2023-11-14 | Stop reason: WASHOUT

## 2023-11-12 RX ORDER — ASCORBIC ACID 250 MG
500 TABLET ORAL DAILY
COMMUNITY
Start: 2015-11-18 | End: 2023-11-14 | Stop reason: WASHOUT

## 2023-11-12 RX ORDER — NAPROXEN SODIUM 220 MG/1
1 TABLET, FILM COATED ORAL DAILY
COMMUNITY
Start: 2023-02-07 | End: 2023-11-14 | Stop reason: WASHOUT

## 2023-11-12 RX ORDER — CHOLECALCIFEROL (VITAMIN D3) 25 MCG
1 TABLET ORAL DAILY
COMMUNITY
Start: 2020-02-20 | End: 2024-01-11 | Stop reason: SDUPTHER

## 2023-11-12 RX ORDER — PANTOPRAZOLE SODIUM 20 MG/1
20 TABLET, DELAYED RELEASE ORAL 2 TIMES DAILY
COMMUNITY
Start: 2023-06-21 | End: 2023-11-14 | Stop reason: WASHOUT

## 2023-11-12 RX ORDER — IBUPROFEN 400 MG/1
1 TABLET ORAL 3 TIMES DAILY PRN
COMMUNITY
Start: 2022-09-01 | End: 2023-11-14 | Stop reason: WASHOUT

## 2023-11-12 RX ORDER — ACETAMINOPHEN 325 MG/1
650 TABLET ORAL EVERY 4 HOURS PRN
COMMUNITY
Start: 2023-02-07 | End: 2023-11-14 | Stop reason: WASHOUT

## 2023-11-12 RX ORDER — ASPIRIN 81 MG
TABLET, DELAYED RELEASE (ENTERIC COATED) ORAL
COMMUNITY
Start: 2022-07-08 | End: 2023-11-14 | Stop reason: WASHOUT

## 2023-11-12 RX ORDER — LIDOCAINE 560 MG/1
PATCH PERCUTANEOUS; TOPICAL; TRANSDERMAL
COMMUNITY
Start: 2022-09-01

## 2023-11-12 RX ORDER — CIPROFLOXACIN 500 MG/1
500 TABLET ORAL 2 TIMES DAILY
COMMUNITY
Start: 2023-06-20 | End: 2023-11-14 | Stop reason: WASHOUT

## 2023-11-12 RX ORDER — ATORVASTATIN CALCIUM 40 MG/1
TABLET, FILM COATED ORAL
COMMUNITY
Start: 2023-02-07 | End: 2023-11-14 | Stop reason: WASHOUT

## 2023-11-12 RX ORDER — ASCORBIC ACID 500 MG
1 TABLET ORAL DAILY
COMMUNITY

## 2023-11-13 ENCOUNTER — APPOINTMENT (OUTPATIENT)
Dept: PRIMARY CARE | Facility: CLINIC | Age: 78
End: 2023-11-13
Payer: COMMERCIAL

## 2023-11-14 ENCOUNTER — OFFICE VISIT (OUTPATIENT)
Dept: CARDIOLOGY | Facility: HOSPITAL | Age: 78
End: 2023-11-14
Payer: COMMERCIAL

## 2023-11-14 VITALS
HEART RATE: 66 BPM | OXYGEN SATURATION: 93 % | WEIGHT: 154 LBS | DIASTOLIC BLOOD PRESSURE: 70 MMHG | SYSTOLIC BLOOD PRESSURE: 132 MMHG | BODY MASS INDEX: 31.04 KG/M2 | HEIGHT: 59 IN

## 2023-11-14 DIAGNOSIS — I10 PRIMARY HYPERTENSION: ICD-10-CM

## 2023-11-14 DIAGNOSIS — I10 HYPERTENSION, UNSPECIFIED TYPE: Primary | ICD-10-CM

## 2023-11-14 DIAGNOSIS — R06.02 SHORTNESS OF BREATH: ICD-10-CM

## 2023-11-14 PROCEDURE — 99204 OFFICE O/P NEW MOD 45 MIN: CPT | Performed by: INTERNAL MEDICINE

## 2023-11-14 PROCEDURE — 3078F DIAST BP <80 MM HG: CPT | Performed by: INTERNAL MEDICINE

## 2023-11-14 PROCEDURE — 3075F SYST BP GE 130 - 139MM HG: CPT | Performed by: INTERNAL MEDICINE

## 2023-11-14 PROCEDURE — 93010 ELECTROCARDIOGRAM REPORT: CPT | Performed by: INTERNAL MEDICINE

## 2023-11-14 PROCEDURE — 99214 OFFICE O/P EST MOD 30 MIN: CPT | Performed by: INTERNAL MEDICINE

## 2023-11-14 PROCEDURE — 1126F AMNT PAIN NOTED NONE PRSNT: CPT | Performed by: INTERNAL MEDICINE

## 2023-11-14 PROCEDURE — 93005 ELECTROCARDIOGRAM TRACING: CPT | Performed by: INTERNAL MEDICINE

## 2023-11-14 PROCEDURE — 1036F TOBACCO NON-USER: CPT | Performed by: INTERNAL MEDICINE

## 2023-11-14 PROCEDURE — 1159F MED LIST DOCD IN RCRD: CPT | Performed by: INTERNAL MEDICINE

## 2023-11-14 ASSESSMENT — ENCOUNTER SYMPTOMS
LOSS OF SENSATION IN FEET: 0
DEPRESSION: 0
OCCASIONAL FEELINGS OF UNSTEADINESS: 0

## 2023-11-14 ASSESSMENT — PAIN SCALES - GENERAL: PAINLEVEL: 0-NO PAIN

## 2023-11-14 NOTE — PROGRESS NOTES
Subjective   John Deutsch is a 78 y.o. female presenting for cardiology review of chest pain on a background of Crohn's disease, GERD, HTN, impaired glucose tolerance, anemia.     Investigations:  Nuclear stress test (11/2023) - no evidence of ischemia or infarction.   EKG (11/2023) - SR, non-specific T-wave abnormality.   TTE (12/2021) - LVEF 65-70%, diastolic dysfunction, elevated LAP, mildly elevated RVSP.     Chief Complaint:  Chest pain.     HPI  Chest pain  - a few months ago, central chest radiates to back.    - went to ER, troponin negative, discharged from ER.   - nuclear stress test negative.   - symptoms have since improved.     Exertional dyspnea  - fatigued with walking.   - can walk 5 min on flat before stopping.   - Hb 12.5 (2/2023)  - all BNPs on file are negative.     Occasional ankle swelling.  No orthopnea or PND.   No palpitations, presyncope or syncope.    CV risk:  Never smoker.   Cr 0.60    LDL 85, TGL 95  Fam hx - two sisters had CABG.  HbA1c 5.9%.   HTN - on amlodpine      ROS  A 10-system review was performed and was unremarkable apart from what is presented in the HPI.     Objective   Physical Exam  Alert and orientated.   Appropriate responses, normal affect.  No respiratory distress at rest.   Skin warm and dry.   Normal radial pulse character and volume. Clinically SR.   Anicteric sclera, no conjunctival pallor.   No JVD or carotid bruits.  Heart sounds dual, no added heart sounds or audible murmurs.   Chest clear on auscultation.   Calves soft, non-tender.  No pedal edema.  EKG - NSR, no ST changes.    Lab Review:   Lab Results   Component Value Date     02/07/2023    K 3.9 02/07/2023     02/07/2023    CO2 28 02/07/2023    BUN 13 02/07/2023    CREATININE 0.60 02/07/2023    GLUCOSE 92 02/07/2023    CALCIUM 9.6 02/07/2023     Lab Results   Component Value Date    TROPONINI <0.02 12/05/2021     Lab Results   Component Value Date    WBC 6.7 02/06/2023    HGB 12.5  02/06/2023    HCT 36.3 02/06/2023    MCV 85 02/06/2023     02/06/2023     Lab Results   Component Value Date    CHOL 167 02/07/2023    TRIG 95 02/07/2023    HDL 63.2 02/07/2023       Assessment/Plan   In summary, John Deutsch is a 78 y.o. female presenting for cardiology review of chest pain on a background of Crohn's disease, GERD, HTN, impaired glucose tolerance, anemia. She presented to ER for assessment of troponin negative chest pain and a subsequent nuclear stress test was negative for ischemia. Her chest pain has since resolved. On examination today, she was euvolemic and normotensive. Her EKG showed sinus rhythm with no ST changes. I have ordered a CACS to further assess her CV risk and to guide statin therapy. In the interim she should continue her current medications. I will follow-up with John after her investigations.

## 2023-11-14 NOTE — PATIENT INSTRUCTIONS
Thank you for attending the cardiology clinic at [facility] today. It was nice to meet you.    Your cardiovascular examination today was within normal limits. Your EKG showed a normal heart rhythm.     I have ordered a CT scan to check for plaque in your heart arteries. Please call [phone] to schedule this test.     Continue your medications.     I will notify you of your CT result when available.

## 2023-11-15 ENCOUNTER — EVALUATION (OUTPATIENT)
Dept: PHYSICAL THERAPY | Facility: HOSPITAL | Age: 78
End: 2023-11-15
Payer: COMMERCIAL

## 2023-11-15 DIAGNOSIS — G89.29 CHRONIC BILATERAL LOW BACK PAIN WITHOUT SCIATICA: Primary | ICD-10-CM

## 2023-11-15 DIAGNOSIS — M25.551 BILATERAL HIP PAIN: ICD-10-CM

## 2023-11-15 DIAGNOSIS — M25.552 BILATERAL HIP PAIN: ICD-10-CM

## 2023-11-15 DIAGNOSIS — M54.50 CHRONIC BILATERAL LOW BACK PAIN WITHOUT SCIATICA: Primary | ICD-10-CM

## 2023-11-15 PROCEDURE — 97162 PT EVAL MOD COMPLEX 30 MIN: CPT | Mod: GP | Performed by: PHYSICAL THERAPIST

## 2023-11-15 PROCEDURE — 97110 THERAPEUTIC EXERCISES: CPT | Mod: GP | Performed by: PHYSICAL THERAPIST

## 2023-11-15 ASSESSMENT — ENCOUNTER SYMPTOMS
LOSS OF SENSATION IN FEET: 0
DEPRESSION: 0
OCCASIONAL FEELINGS OF UNSTEADINESS: 0

## 2023-11-15 NOTE — PROGRESS NOTES
Physical Therapy    Physical Therapy Evaluation and Treatment      Patient Name: John Deutsch  MRN: [id number]  Today's Date: 11/15/2023  Time Calculation  Start Time: 0900  Stop Time: 0945  Time Calculation (min): 45 min      Assessment:  77 yo female referred to PT by PCP for B hip pain, upon presentation for initial evaluation, pt states most pain across lower back to mid back, sometimes to post thigh, pt displays some kyphosis posture with slight scoliosis correlating to age, discomfort with ext compare to flex, consistent with degenerative changes L spine, will engage in PT to work on flex based core strengthening and some balance training to help improve on functional mobility. Pt is instructed in HEP and agreed with plan.       Plan:  1-2x/week to work on functional strengthening and flex based core strengthening.  Modalities prn.    PT Plan: Skilled PT  PT Frequency:  (1-2x/week)  Duration: 10 weeks  Onset Date: 10/09/23  Certification Period Start Date: 11/15/23  Certification Period End Date: 02/13/24  Number of Treatments Authorized: MN  Rehab Potential: Good  Plan of Care Agreement: Patient    Current Problem:   1. Chronic bilateral low back pain without sciatica  Follow Up In Physical Therapy      2. Bilateral hip pain  Referral to Physical Therapy    Follow Up In Physical Therapy          Subjective    HPI: on and off back pain across lower back and sometimes to hips and to post thigh since 2021, no particular treatment, states started after taking covid vaccine, seen PCP recently and referred to PT.      IMAGING: none recent.      PMH: HTN, crohn's disease.     MEDICATIONS: tylenol for pain but not as effect, lidocaine patch or valtaren gel prn.    SOCIAL HX/JOB STATUS:lives in 1st floor of 2 family, 4 steps with railing, independent with ADLs.      BASELINE FUNCTION:  no regular exercises.     PAIN: current 5/10 Worst 8-10/10     best 3/10  description and location of pain: throbbing across mid to  lower back, sometimes to B thighs post, sometimes to post hip.    Pain aggravated by: standing and walking prolonged time, going uphill, sometimes with prolonged sitting.    Pain relieved by: rest.    General:  General  Reason for Referral: B hip pain  Referred By: Omar Sanchez MD  Past Medical History Relevant to Rehab: HTN, Crohn's disease  Precautions:  Precautions  STEADI Fall Risk Score (The score of 4 or more indicates an increased risk of falling): 0      Objective   OBSERVATION: standing even pelvis, slight T/L scoliosis appearance, forward trunk posture.     PALPATION: no increased TTP with palpation today.    SENSATION: WNL    Lumbar ROM: *Pain  Flexion 80% with no increased pain  Extension 80% with discomfort at lower back,   SB 80% B.  Rotation R   50%,         L    80% with no increased pain.            Hip ROM WFL with no increased pain today.     MYOTOMES: WNL B LE.     FUNCTIONAL STRENGTH:  Able to supine bridge with mod pelvic clearance with slight straining sensation.   Able to with HHA heel walk, toe walk with shuflling gait, double leg 1/4 squat.  Balance unable to SLS without HHA, with HHA 3-5sec each side.   Gait slight shuffling gait with amb on level.     SPECIAL TEST:  Pelvis alignment: even  SLR neg cross SLR neg  Slump test neg  Hip SARAH: neg  Supine flat lying with discomfort, hooklying with ease  DKTC with no increased pain    Outcome Measures:  Other Measures  Oswestry Disablity Index (MEE): 28%     Treatments:  Treatment provided today:   Educated patient on evaluation findings and POC, expectations and course of PT, questions addressed.   HEP: instructed in LTR, SKTC, supine bridging, sidelying hip abd, standing heel raises with counter support.     Goals:  To be met at time of discharge:  -- Decrease pain to _<2/10__.  -- Independent with HEP.  -- ROM: able to tolerate ROM L spine all directions >80% range with no increased pain.  -- Functional outcome: able to SLS with 2 finger  support x10sec or better each side for balance.  Able to supine bridging with good pelvic clearance with no increased pain.  Able to tolerate core stab program in clinic x25min with pain <2/10 for ease with activities.   Tolerate walking, standing for houseshold activities with pain <2/10,  Improve oswestry to <16%.

## 2023-11-27 DIAGNOSIS — I10 PRIMARY HYPERTENSION: ICD-10-CM

## 2023-11-27 LAB
ATRIAL RATE: 71 BPM
P AXIS: 42 DEGREES
P OFFSET: 215 MS
P ONSET: 162 MS
PR INTERVAL: 120 MS
Q ONSET: 222 MS
QRS COUNT: 12 BEATS
QRS DURATION: 80 MS
QT INTERVAL: 416 MS
QTC CALCULATION(BAZETT): 452 MS
QTC FREDERICIA: 440 MS
R AXIS: 48 DEGREES
T AXIS: 22 DEGREES
T OFFSET: 430 MS
VENTRICULAR RATE: 71 BPM

## 2023-11-27 RX ORDER — AMLODIPINE BESYLATE 10 MG/1
10 TABLET ORAL DAILY
Qty: 90 TABLET | Refills: 3 | Status: SHIPPED | OUTPATIENT
Start: 2023-11-27 | End: 2024-11-21

## 2023-11-28 ENCOUNTER — HOSPITAL ENCOUNTER (OUTPATIENT)
Dept: RADIOLOGY | Facility: HOSPITAL | Age: 78
Discharge: HOME | End: 2023-11-28
Payer: COMMERCIAL

## 2023-11-28 DIAGNOSIS — R06.02 SHORTNESS OF BREATH: ICD-10-CM

## 2023-11-28 PROCEDURE — 75571 CT HRT W/O DYE W/CA TEST: CPT

## 2023-11-30 ENCOUNTER — TREATMENT (OUTPATIENT)
Dept: PHYSICAL THERAPY | Facility: HOSPITAL | Age: 78
End: 2023-11-30
Payer: COMMERCIAL

## 2023-11-30 DIAGNOSIS — M54.50 CHRONIC BILATERAL LOW BACK PAIN WITHOUT SCIATICA: Primary | ICD-10-CM

## 2023-11-30 DIAGNOSIS — M25.552 BILATERAL HIP PAIN: ICD-10-CM

## 2023-11-30 DIAGNOSIS — M25.551 BILATERAL HIP PAIN: ICD-10-CM

## 2023-11-30 DIAGNOSIS — G89.29 CHRONIC BILATERAL LOW BACK PAIN WITHOUT SCIATICA: Primary | ICD-10-CM

## 2023-11-30 PROCEDURE — 97110 THERAPEUTIC EXERCISES: CPT | Mod: GP | Performed by: PHYSICAL THERAPIST

## 2023-11-30 NOTE — PROGRESS NOTES
Physical Therapy Treatment    Patient Name: John Deutsch  MRN: [id number]  Today's Date: 11/30/2023  Time Calculation  Start Time: 0145  Stop Time: 0225  Time Calculation (min): 40 min      Assessment:  Doing well with balance, tends to rush through activities, overall did well with balance and core strengthening, some SOB noted.  Pain at 3/10 at end of session.       Plan:  Continue POC for 1-2 more sessions to work on functional strengthening and flex based core strengthening.  Modalities prn.     OP PT Plan  PT Plan: Skilled PT (visit 2)  PT Frequency:  (1-2x/week)  Duration: 10 weeks  Onset Date: 10/09/23  Certification Period Start Date: 11/15/23  Certification Period End Date: 02/13/24  Number of Treatments Authorized: MN  Rehab Potential: Good  Plan of Care Agreement: Patient    Current Problem  1. Chronic bilateral low back pain without sciatica        2. Bilateral hip pain            Subjective   Pt states she is feeling some pain mostly on R side lower back toward hip, pain rated 8/10, states she did some of her HEP only (was very cold with her furnace out at home).    General  Reason for Referral: B hip pain  Referred By: Omar Sanchez MD  Past Medical History Relevant to Rehab: HTN, Crohn's disease      Objective   Able to balance SLS with 2 finger support 1min each side  6MWT 1000 feet good pace, no rest  Outcome Measures:       Treatments:  Therapeutic exercises:  Recumbent stepper L1 x7min  HHA, double leg heel raises x15  HHA hip 3 ways x10 R/L  2 finger support, SLS x1min R/L   6in step lateral step down x15 R/L   Supine bridging 2x15  LTR x10 R/L   Reverse DKTC x10   TA with SLR x15 R/L  Curl up x10  Sidelying hip abd x15 R/L   6MWT     Goals:   To be met at time of discharge:  -- Decrease pain to _<2/10__.  -- Independent with HEP.  -- ROM: able to tolerate ROM L spine all directions >80% range with no increased pain.  -- Functional outcome: able to SLS with 2 finger support x10sec or better  each side for balance.  Able to supine bridging with good pelvic clearance with no increased pain.  Able to tolerate core stab program in clinic x25min with pain <2/10 for ease with activities.   Tolerate walking, standing for houseshold activities with pain <2/10,  Improve oswestry to <16%.

## 2023-12-11 ENCOUNTER — DOCUMENTATION (OUTPATIENT)
Dept: PRIMARY CARE | Facility: CLINIC | Age: 78
End: 2023-12-11
Payer: COMMERCIAL

## 2023-12-11 NOTE — PROGRESS NOTES
Pt called requesting refill of amlodipine. Noted rx sent to pharmacy on 11/27. Noted same pharmacy, different location. Call placed to pharmacy to find out if rx is able to be sent to another location. Pharmacist confirms pt can call desired location to have filled because it is in system. Call placed to pt to inform. No answer, voicemail left to return nurse call.

## 2023-12-13 ENCOUNTER — TREATMENT (OUTPATIENT)
Dept: PHYSICAL THERAPY | Facility: HOSPITAL | Age: 78
End: 2023-12-13
Payer: COMMERCIAL

## 2023-12-13 DIAGNOSIS — M54.50 CHRONIC BILATERAL LOW BACK PAIN WITHOUT SCIATICA: Primary | ICD-10-CM

## 2023-12-13 DIAGNOSIS — M25.551 BILATERAL HIP PAIN: ICD-10-CM

## 2023-12-13 DIAGNOSIS — M25.552 BILATERAL HIP PAIN: ICD-10-CM

## 2023-12-13 DIAGNOSIS — G89.29 CHRONIC BILATERAL LOW BACK PAIN WITHOUT SCIATICA: Primary | ICD-10-CM

## 2023-12-13 PROCEDURE — 97110 THERAPEUTIC EXERCISES: CPT | Mod: GP | Performed by: PHYSICAL THERAPIST

## 2023-12-13 NOTE — PROGRESS NOTES
Physical Therapy Treatment    Patient Name: John Deutsch  MRN: [id number]  Today's Date: 12/13/2023  Time Calculation  Start Time: 0225  Stop Time: 0305  Time Calculation (min): 40 min      Assessment:  Doing well with core and functional strengthening, pain at 2//10 post session.      Plan:  1 more session, check goals, prepare for Discharge.        OP PT Plan  PT Plan: Skilled PT (visit 3)  PT Frequency:  (1-2x/week)  Duration: 10 weeks  Onset Date: 10/09/23  Certification Period Start Date: 11/15/23  Certification Period End Date: 02/13/24  Number of Treatments Authorized: MN  Rehab Potential: Good  Plan of Care Agreement: Patient    Current Problem  1. Chronic bilateral low back pain without sciatica        2. Bilateral hip pain              Subjective   Pt states some pain across lower back mostly, some to the buttocks, pain at 7/10,  only did hip abd HEP, some walking at home to and from the store. Feels tired with walking, but mostly limited by her stomach (crohn's).      General  Reason for Referral: B hip pain  Referred By: Omar Sanchez MD  Past Medical History Relevant to Rehab: HTN, Crohn's disease      Objective   12/13/23: L spine ROM WNL today with no increased pain at end of session.   11/30/23: Able to balance SLS with 2 finger support 1min each side  6MWT 1000 feet good pace, no rest  Outcome Measures:       Treatments:  Therapeutic exercises:  Recumbent stepper L2 x7min  HHA, double leg heel raises x15  HHA hip 3 ways x10 R/L  HHA double leg 1/2 squat x15   6in step up and over, forward and retro x15 R/L   Supine bridging 2x15  Reverse DKTC 2x15   TA with SLR x15 R/L  Curl up 2x10  Sidelying hip abd x15 R/L     Goals:   To be met at time of discharge:  -- Decrease pain to _<2/10__.  -- Independent with HEP.  -- ROM: able to tolerate ROM L spine all directions >80% range with no increased pain.  -- Functional outcome: able to SLS with 2 finger support x10sec or better each side for  balance.  Able to supine bridging with good pelvic clearance with no increased pain.  Able to tolerate core stab program in clinic x25min with pain <2/10 for ease with activities.   Tolerate walking, standing for houseshold activities with pain <2/10,  Improve oswestry to <16%.

## 2023-12-28 ENCOUNTER — APPOINTMENT (OUTPATIENT)
Dept: RADIOLOGY | Facility: HOSPITAL | Age: 78
End: 2023-12-28
Payer: COMMERCIAL

## 2024-01-04 ENCOUNTER — APPOINTMENT (OUTPATIENT)
Dept: PHYSICAL THERAPY | Facility: HOSPITAL | Age: 79
End: 2024-01-04

## 2024-01-04 ENCOUNTER — APPOINTMENT (OUTPATIENT)
Dept: PRIMARY CARE | Facility: CLINIC | Age: 79
End: 2024-01-04
Payer: COMMERCIAL

## 2024-01-11 ENCOUNTER — CLINICAL SUPPORT (OUTPATIENT)
Dept: EMERGENCY MEDICINE | Facility: HOSPITAL | Age: 79
End: 2024-01-11
Payer: COMMERCIAL

## 2024-01-11 ENCOUNTER — APPOINTMENT (OUTPATIENT)
Dept: RADIOLOGY | Facility: HOSPITAL | Age: 79
End: 2024-01-11
Payer: COMMERCIAL

## 2024-01-11 ENCOUNTER — HOSPITAL ENCOUNTER (EMERGENCY)
Facility: HOSPITAL | Age: 79
Discharge: HOME | End: 2024-01-11
Payer: COMMERCIAL

## 2024-01-11 VITALS
SYSTOLIC BLOOD PRESSURE: 136 MMHG | WEIGHT: 140 LBS | OXYGEN SATURATION: 98 % | HEIGHT: 59 IN | BODY MASS INDEX: 28.22 KG/M2 | DIASTOLIC BLOOD PRESSURE: 64 MMHG | TEMPERATURE: 99.1 F | RESPIRATION RATE: 16 BRPM | HEART RATE: 86 BPM

## 2024-01-11 DIAGNOSIS — U07.1 COVID: Primary | ICD-10-CM

## 2024-01-11 LAB
ANION GAP SERPL CALC-SCNC: 13 MMOL/L (ref 10–20)
BASOPHILS # BLD AUTO: 0.02 X10*3/UL (ref 0–0.1)
BASOPHILS NFR BLD AUTO: 0.3 %
BUN SERPL-MCNC: 9 MG/DL (ref 6–23)
CALCIUM SERPL-MCNC: 9.5 MG/DL (ref 8.6–10.6)
CARDIAC TROPONIN I PNL SERPL HS: 3 NG/L (ref 0–34)
CHLORIDE SERPL-SCNC: 104 MMOL/L (ref 98–107)
CO2 SERPL-SCNC: 25 MMOL/L (ref 21–32)
CREAT SERPL-MCNC: 0.71 MG/DL (ref 0.5–1.05)
EGFRCR SERPLBLD CKD-EPI 2021: 88 ML/MIN/1.73M*2
EOSINOPHIL # BLD AUTO: 0.09 X10*3/UL (ref 0–0.4)
EOSINOPHIL NFR BLD AUTO: 1.3 %
ERYTHROCYTE [DISTWIDTH] IN BLOOD BY AUTOMATED COUNT: 13.7 % (ref 11.5–14.5)
FLUAV RNA RESP QL NAA+PROBE: NOT DETECTED
FLUBV RNA RESP QL NAA+PROBE: NOT DETECTED
GLUCOSE SERPL-MCNC: 101 MG/DL (ref 74–99)
HCT VFR BLD AUTO: 33.2 % (ref 36–46)
HGB BLD-MCNC: 12.2 G/DL (ref 12–16)
IMM GRANULOCYTES # BLD AUTO: 0.03 X10*3/UL (ref 0–0.5)
IMM GRANULOCYTES NFR BLD AUTO: 0.4 % (ref 0–0.9)
LYMPHOCYTES # BLD AUTO: 1.27 X10*3/UL (ref 0.8–3)
LYMPHOCYTES NFR BLD AUTO: 18 %
MCH RBC QN AUTO: 30.6 PG (ref 26–34)
MCHC RBC AUTO-ENTMCNC: 36.7 G/DL (ref 32–36)
MCV RBC AUTO: 83 FL (ref 80–100)
MONOCYTES # BLD AUTO: 0.82 X10*3/UL (ref 0.05–0.8)
MONOCYTES NFR BLD AUTO: 11.6 %
NEUTROPHILS # BLD AUTO: 4.84 X10*3/UL (ref 1.6–5.5)
NEUTROPHILS NFR BLD AUTO: 68.4 %
NRBC BLD-RTO: 0 /100 WBCS (ref 0–0)
PLATELET # BLD AUTO: 268 X10*3/UL (ref 150–450)
POTASSIUM SERPL-SCNC: 3.4 MMOL/L (ref 3.5–5.3)
RBC # BLD AUTO: 3.99 X10*6/UL (ref 4–5.2)
SARS-COV-2 RNA RESP QL NAA+PROBE: DETECTED
SODIUM SERPL-SCNC: 139 MMOL/L (ref 136–145)
WBC # BLD AUTO: 7.1 X10*3/UL (ref 4.4–11.3)

## 2024-01-11 PROCEDURE — 99283 EMERGENCY DEPT VISIT LOW MDM: CPT

## 2024-01-11 PROCEDURE — 85025 COMPLETE CBC W/AUTO DIFF WBC: CPT | Performed by: PHYSICIAN ASSISTANT

## 2024-01-11 PROCEDURE — 71046 X-RAY EXAM CHEST 2 VIEWS: CPT | Performed by: STUDENT IN AN ORGANIZED HEALTH CARE EDUCATION/TRAINING PROGRAM

## 2024-01-11 PROCEDURE — 99285 EMERGENCY DEPT VISIT HI MDM: CPT | Performed by: PHYSICIAN ASSISTANT

## 2024-01-11 PROCEDURE — 80048 BASIC METABOLIC PNL TOTAL CA: CPT | Performed by: PHYSICIAN ASSISTANT

## 2024-01-11 PROCEDURE — 94640 AIRWAY INHALATION TREATMENT: CPT | Mod: 59

## 2024-01-11 PROCEDURE — 2500000002 HC RX 250 W HCPCS SELF ADMINISTERED DRUGS (ALT 637 FOR MEDICARE OP, ALT 636 FOR OP/ED): Performed by: PHYSICIAN ASSISTANT

## 2024-01-11 PROCEDURE — 36415 COLL VENOUS BLD VENIPUNCTURE: CPT | Performed by: PHYSICIAN ASSISTANT

## 2024-01-11 PROCEDURE — 71046 X-RAY EXAM CHEST 2 VIEWS: CPT

## 2024-01-11 PROCEDURE — 84484 ASSAY OF TROPONIN QUANT: CPT | Performed by: PHYSICIAN ASSISTANT

## 2024-01-11 PROCEDURE — 87636 SARSCOV2 & INF A&B AMP PRB: CPT | Performed by: PHYSICIAN ASSISTANT

## 2024-01-11 PROCEDURE — 93005 ELECTROCARDIOGRAM TRACING: CPT

## 2024-01-11 PROCEDURE — 99284 EMERGENCY DEPT VISIT MOD MDM: CPT

## 2024-01-11 RX ORDER — CHOLECALCIFEROL (VITAMIN D3) 25 MCG
2000 TABLET ORAL DAILY
Qty: 30 TABLET | Refills: 0 | Status: SHIPPED | OUTPATIENT
Start: 2024-01-11

## 2024-01-11 RX ORDER — ACETAMINOPHEN 325 MG/1
975 TABLET ORAL ONCE
Status: COMPLETED | OUTPATIENT
Start: 2024-01-11 | End: 2024-01-11

## 2024-01-11 RX ORDER — IPRATROPIUM BROMIDE AND ALBUTEROL SULFATE 2.5; .5 MG/3ML; MG/3ML
3 SOLUTION RESPIRATORY (INHALATION) ONCE
Status: COMPLETED | OUTPATIENT
Start: 2024-01-11 | End: 2024-01-11

## 2024-01-11 RX ADMIN — IPRATROPIUM BROMIDE AND ALBUTEROL SULFATE 3 ML: .5; 3 SOLUTION RESPIRATORY (INHALATION) at 18:51

## 2024-01-11 RX ADMIN — ACETAMINOPHEN 975 MG: 325 TABLET ORAL at 18:51

## 2024-01-11 ASSESSMENT — COLUMBIA-SUICIDE SEVERITY RATING SCALE - C-SSRS
2. HAVE YOU ACTUALLY HAD ANY THOUGHTS OF KILLING YOURSELF?: NO
1. IN THE PAST MONTH, HAVE YOU WISHED YOU WERE DEAD OR WISHED YOU COULD GO TO SLEEP AND NOT WAKE UP?: NO
6. HAVE YOU EVER DONE ANYTHING, STARTED TO DO ANYTHING, OR PREPARED TO DO ANYTHING TO END YOUR LIFE?: NO

## 2024-01-11 ASSESSMENT — PAIN - FUNCTIONAL ASSESSMENT: PAIN_FUNCTIONAL_ASSESSMENT: 0-10

## 2024-01-11 ASSESSMENT — PAIN SCALES - GENERAL: PAINLEVEL_OUTOF10: 8

## 2024-01-11 NOTE — ED PROVIDER NOTES
HPI   Chief Complaint   Patient presents with    Flu Symptoms       HPI: Patient is a 78-year-old female with history of hypertension, Crohn's, GERD who presents to the ED for flu signs and symptoms that been ongoing for 1 week.  Patient is endorsing congestion, muscle aches, fatigue, cold chills, increased, cough and pain under her right rib cage.  Family at bedside states that she seems more short of breath than her baseline.  Endorses that people at her [employer] have been getting sick.  Denies any nausea, vomiting, lower extremity swelling, hemoptysis.  ------------------------------------------------------------------------------------------------------------------------------------------  ROS: a ten point review of systems was performed and was negative except as per HPI.  ------------------------------------------------------------------------------------------------------------------------------------------  PMH / PSH: as per HPI, otherwise reviewed   MEDS: as per HPI, otherwise reviewed in EMR  ALLERGIES: as per HPI, otherwise reviewed in EMR  SocH:  as per HPI, otherwise reviewed in EMR  FH:  as per HPI, otherwise reviewed in EMR   ------------------------------------------------------------------------------------------------------------------------------------------  Physical Exam:  VS: As documented in the triage note and EMR flowsheet from this visit was reviewed  General: Well appearing. No acute distress.   Eyes:  Extraocular movements grossly intact. No scleral icterus.   Head: Atraumatic. Normocephalic.     Neck: No meningismus. No gross masses. Full movement through range of motion  CV: Regular rhythm. No murmurs, rubs, gallops appreciated.   Resp: Clear to auscultation bilaterally. No respiratory distress.    GI: Nontender. Soft. No masses. No rebound, rigidity or guarding.   MSK: Symmetric muscle bulk. No gross step offs or deformities.  Skin: Warm, dry. No rashes  Neuro: CN II-VII intact. A&O x3.  Speech fluent. Alert. Moving all extremities. Ambulates with normal gait  Psych: Appropriate mood and affect for situation  ------------------------------------------------------------------------------------------------------------------------------------------  Hospital Course / Medical Decision Making: Patient is a 78-year-old female presents the ED for flu signs and symptoms that been ongoing for 1 week.  On examination, patient is overall well-appearing.  Vitals are stable. Satting 100% on RA.  No respiratory distress on exam.  EKG obtained and showed normal sinus rhythm with a rate of 80 bpm, normal axis, no ST-T wave changes.  Patient endorsing congestion, body aches, cough and pain under her right rib cage.  Family concerned that she is more short of breath than normal.  Cardiopulmonary examination without any adventitious breath sounds.  Patient administered DuoNeb treatments and acetaminophen.  CBC shows no evidence of leukocytosis or anemia.  Troponin within normal range.  BMP shows mild hypokalemia 3.4, otherwise unremarkable.  Influenza swab negative.  Chest x-ray shows no evidence of acute cardiopulmonary process.  COVID screening was positive.  Patient ambulated with a pulse ox of 94%.  Feel the patient can be managed conservatively at home.  This is day 5 of symptoms for her.  She was advised on oral hydration, Tylenol, Motrin. Patient has remained hemodynamically stable throughout the course of their ED stay.  Patient is home-going.  Patient advised to return to the ED for any worsening symptoms.  Advised to follow-up with PCP.  Patient was discharged in stable condition.                                Ave Coma Scale Score: 15                  Patient History   Past Medical History:   Diagnosis Date    Age-related nuclear cataract, bilateral 02/01/2016    Age-related nuclear cataract of both eyes    Age-related nuclear cataract, unspecified eye 08/01/2016    Nuclear sclerosis    Combined forms  of age-related cataract, right eye 2022    Combined form of age-related cataract, right eye    Crohn's disease, unspecified, without complications (CMS/[facility]) 2016    Crohn disease    Crohn's disease, unspecified, without complications (CMS/[facility]) 2013    Crohn's disease    Drusen (degenerative) of macula, bilateral 2016    Drusen (degenerative) of macula, bilateral    Dry eye syndrome of unspecified lacrimal gland 2016    Dry eye syndrome    Encounter for general adult medical examination without abnormal findings 2022    Medicare annual wellness visit, subsequent    Nonexudative age-related macular degeneration, bilateral, stage unspecified 2016    Age-related macular degeneration, dry, both eyes    Other abnormal and inconclusive findings on diagnostic imaging of breast 2013    Mammogram abnormal    Other conditions influencing health status     Congenital Stenosis Of Large Intestine    Other specified noninflammatory disorders of uterus     Fibrosis of uterus    Personal history of diseases of the skin and subcutaneous tissue 2014    History of urticaria    Personal history of other diseases of the respiratory system 2014    History of acute bronchitis    Personal history of urinary (tract) infections 2014    History of urinary tract infection    Presbyopia 2016    Presbyopia OU    Presence of intraocular lens 2022    Pseudophakia of left eye    Stenosis of anus and rectum     Rectal stricture    Unspecified astigmatism, bilateral 2016    Astigmatism, bilateral     Past Surgical History:   Procedure Laterality Date     SECTION, CLASSIC  10/02/2013     Section    COLONOSCOPY  10/02/2013    Complete Colonoscopy    OTHER SURGICAL HISTORY  10/02/2013    Colonoscopy (Fiberoptic) Forceps Biopsy    OTHER SURGICAL HISTORY  10/02/2013    Proctosigmoidoscopy (Rigid - Diagnostic)    OTHER SURGICAL HISTORY  10/02/2013    Ileoanal  Pouch Fistula Repair Transperin & Transabd Approach     Family History   Problem Relation Name Age of Onset    Breast cancer Sister      Stroke Brother      Diabetes Daughter      Obesity Son      Diabetes type II Son      Crohn's disease Other FAMILY HISTORY      Social History     Tobacco Use    Smoking status: Never    Smokeless tobacco: Never   Substance Use Topics    Alcohol use: Never    Drug use: Never       Physical Exam   ED Triage Vitals [01/11/24 1803]   Temp Heart Rate Resp BP   37.3 °C (99.1 °F) 84 18 131/67      SpO2 Temp Source Heart Rate Source Patient Position   100 % Oral -- --      BP Location FiO2 (%)     -- --       Physical Exam    ED Course & MDM   Diagnoses as of 01/11/24 2041   COVID       Medical Decision Making      Procedure  Procedures     Kristen Sanchez PA-C  01/11/24 2042

## 2024-01-11 NOTE — ED TRIAGE NOTES
Pt to ED for nonproductive cough, congestion, diffuse muscle aches, and bilateral ear aches for 1 wk. She reports R rib pain when she coughs, otherwise denies chest pain or shortness of breath. She reports people at her [employer] have also been getting sick.

## 2024-01-12 LAB
ATRIAL RATE: 80 BPM
P AXIS: 47 DEGREES
P OFFSET: 218 MS
P ONSET: 169 MS
PR INTERVAL: 114 MS
Q ONSET: 226 MS
QRS COUNT: 14 BEATS
QRS DURATION: 86 MS
QT INTERVAL: 374 MS
QTC CALCULATION(BAZETT): 431 MS
QTC FREDERICIA: 412 MS
R AXIS: 72 DEGREES
T AXIS: 161 DEGREES
T OFFSET: 413 MS
VENTRICULAR RATE: 80 BPM

## 2024-02-12 ENCOUNTER — OFFICE VISIT (OUTPATIENT)
Dept: PRIMARY CARE | Facility: CLINIC | Age: 79
End: 2024-02-12
Payer: COMMERCIAL

## 2024-02-12 ENCOUNTER — LAB (OUTPATIENT)
Dept: LAB | Facility: LAB | Age: 79
End: 2024-02-12
Payer: COMMERCIAL

## 2024-02-12 VITALS
TEMPERATURE: 96.9 F | DIASTOLIC BLOOD PRESSURE: 77 MMHG | SYSTOLIC BLOOD PRESSURE: 139 MMHG | WEIGHT: 158.9 LBS | BODY MASS INDEX: 32.09 KG/M2 | HEART RATE: 93 BPM | OXYGEN SATURATION: 95 %

## 2024-02-12 DIAGNOSIS — Z00.00 HEALTH CARE MAINTENANCE: Primary | ICD-10-CM

## 2024-02-12 DIAGNOSIS — I10 PRIMARY HYPERTENSION: ICD-10-CM

## 2024-02-12 DIAGNOSIS — Z00.00 HEALTH CARE MAINTENANCE: ICD-10-CM

## 2024-02-12 LAB
CHOLEST SERPL-MCNC: 167 MG/DL (ref 0–199)
CHOLESTEROL/HDL RATIO: 2.6
EST. AVERAGE GLUCOSE BLD GHB EST-MCNC: 123 MG/DL
HBA1C MFR BLD: 5.9 %
HDLC SERPL-MCNC: 63.6 MG/DL
LDLC SERPL CALC-MCNC: 81 MG/DL
NON HDL CHOLESTEROL: 103 MG/DL (ref 0–149)
TRIGL SERPL-MCNC: 114 MG/DL (ref 0–149)
VLDL: 23 MG/DL (ref 0–40)

## 2024-02-12 PROCEDURE — 36415 COLL VENOUS BLD VENIPUNCTURE: CPT

## 2024-02-12 PROCEDURE — 3075F SYST BP GE 130 - 139MM HG: CPT

## 2024-02-12 PROCEDURE — 1126F AMNT PAIN NOTED NONE PRSNT: CPT

## 2024-02-12 PROCEDURE — 1036F TOBACCO NON-USER: CPT

## 2024-02-12 PROCEDURE — 1159F MED LIST DOCD IN RCRD: CPT

## 2024-02-12 PROCEDURE — 80061 LIPID PANEL: CPT

## 2024-02-12 PROCEDURE — 99213 OFFICE O/P EST LOW 20 MIN: CPT

## 2024-02-12 PROCEDURE — 83036 HEMOGLOBIN GLYCOSYLATED A1C: CPT

## 2024-02-12 PROCEDURE — 3078F DIAST BP <80 MM HG: CPT

## 2024-02-12 NOTE — PATIENT INSTRUCTIONS
It was so great to see you today John Deutsch  . Today we discussed:    -Get blood drawn to check A1c and Lipid panel  -Blood pressure at goal  -Call ENT doctor to check ears and need for Audiology    Call [phone]  For Care if you need to schedule appointment with specialist or images.    Follow up in       You were see by:    Dr. Reba Carlos D.O.  [facility]   Phone: [phone]

## 2024-02-12 NOTE — PROGRESS NOTES
Subjective   Patient ID: John Deutsch is a 78 y.o. female who presents for Follow-up.    HPI     # ED Follow Up  -ED visit on 1/11/24 for flu-like symptoms & found to be COVID+  - since ED, feels better, endorses post viral cough. Did have a nose bleed and called EMS because she was coughing up blood but she was able to stop bleed before EMS arrived.   - Was given tylenol and albuterol in ED      Hypertension  BP Readings from Last 3 Encounters:   02/12/24 139/77   01/11/24 136/64   11/14/23 132/70     - meds: amlodipine 10 mg  - missing doses?: missed yesterday dose. Miss 1-2x a week  - taking BP at home?: no  - smoking: denies  - diet: Turnips, elia greens, & cabbage. Avoid some vegetables d/t Crohn's  - Consistent NSAIDs?: denies  - denies HA, chest pains, SOB, LE edema, vision changes      Review of Systems   Constitutional:  Negative for appetite change, chills, fever and unexpected weight change.   HENT:  Negative for congestion, ear discharge, rhinorrhea and sneezing.    Eyes:  Negative for discharge, itching and visual disturbance.   Respiratory:  Positive for cough. Negative for shortness of breath and wheezing.    Cardiovascular:  Negative for chest pain, palpitations and leg swelling.   Gastrointestinal:  Negative for abdominal pain, constipation, diarrhea, nausea and vomiting.   Endocrine: Negative for cold intolerance and heat intolerance.   Genitourinary:  Negative for dysuria, frequency, hematuria and urgency.   Musculoskeletal:  Negative for joint swelling and myalgias.   Skin:  Negative for rash and wound.   Neurological:  Negative for dizziness, light-headedness and headaches.   Psychiatric/Behavioral:  Negative for confusion, sleep disturbance and suicidal ideas.        Objective   /77 (BP Location: Right arm, Patient Position: Sitting, BP Cuff Size: Adult)   Pulse 93   Temp 36.1 °C (96.9 °F) (Temporal)   Wt 72.1 kg (158 lb 14.4 oz)   SpO2 95%   BMI 32.09 kg/m²     Physical  Exam  HENT:      Head: Normocephalic and atraumatic.      Right Ear: External ear normal.      Left Ear: External ear normal.      Nose: Nose normal.      Mouth/Throat:      Mouth: Mucous membranes are moist.   Eyes:      Conjunctiva/sclera: Conjunctivae normal.   Cardiovascular:      Rate and Rhythm: Normal rate and regular rhythm.      Heart sounds: No murmur heard.  Pulmonary:      Effort: Pulmonary effort is normal.      Breath sounds: Normal breath sounds. No wheezing, rhonchi or rales.   Abdominal:      General: There is no distension.      Palpations: Abdomen is soft.      Tenderness: There is no abdominal tenderness.   Musculoskeletal:         General: Normal range of motion.      Right lower leg: No edema.      Left lower leg: No edema.   Skin:     General: Skin is warm and dry.   Neurological:      General: No focal deficit present.      Mental Status: She is alert.   Psychiatric:         Mood and Affect: Mood normal.         Behavior: Behavior normal.         Assessment/Plan   John Deutsch is a 78 year old female with a past medical history of HTN, GERD, HFpEF (65-70% EF 12/21), and orthopnea presents for ED visit follow post COVID + which symptoms has since resolved and BP check. IO blood pressure at goal with current anti-hypertensive amlodipine.   Diagnoses and all orders for this visit:    HTN, at goal (<140/90)       -    /77, although patient admits to missed doses during the week       -     encouraged to check BP at home and bring in measurements to next visit       -    continue to take amlodipine 10mg daily   Health care maintenance  -     Hemoglobin A1c; Future  -     Lipid panel; Future  Other orders  -     Follow Up In Primary Care    RTC in 6 months for next Adult Well Check/Medicare Wellness and Flu vaccine or earlier if needed.     Discussed with Dr. Sukhi Carlos,   PGY2

## 2024-02-13 ASSESSMENT — ENCOUNTER SYMPTOMS
HEADACHES: 0
WHEEZING: 0
PALPITATIONS: 0
CONFUSION: 0
DIZZINESS: 0
FEVER: 0
WOUND: 0
HEMATURIA: 0
VOMITING: 0
DYSURIA: 0
CHILLS: 0
ABDOMINAL PAIN: 0
RHINORRHEA: 0
SLEEP DISTURBANCE: 0
NAUSEA: 0
DIARRHEA: 0
FREQUENCY: 0
JOINT SWELLING: 0
APPETITE CHANGE: 0
COUGH: 1
CONSTIPATION: 0
LIGHT-HEADEDNESS: 0
EYE ITCHING: 0
MYALGIAS: 0
UNEXPECTED WEIGHT CHANGE: 0
SHORTNESS OF BREATH: 0
EYE DISCHARGE: 0

## 2024-02-13 NOTE — PROGRESS NOTES
I reviewed the resident/fellow's documentation and discussed the patient with the resident/fellow. I agree with the resident/fellow's medical decision making as documented in the note.     Debora Isbell MD

## 2024-03-04 ENCOUNTER — APPOINTMENT (OUTPATIENT)
Dept: PODIATRY | Facility: CLINIC | Age: 79
End: 2024-03-04
Payer: COMMERCIAL

## 2024-06-19 ENCOUNTER — HOSPITAL ENCOUNTER (EMERGENCY)
Facility: HOSPITAL | Age: 79
Discharge: HOME | End: 2024-06-19
Payer: COMMERCIAL

## 2024-06-19 VITALS
BODY MASS INDEX: 29.23 KG/M2 | HEART RATE: 76 BPM | WEIGHT: 145 LBS | SYSTOLIC BLOOD PRESSURE: 135 MMHG | OXYGEN SATURATION: 94 % | RESPIRATION RATE: 16 BRPM | DIASTOLIC BLOOD PRESSURE: 78 MMHG | HEIGHT: 59 IN | TEMPERATURE: 97 F

## 2024-06-19 DIAGNOSIS — T30.0 BURN DUE TO CONTACT WITH HOT WATER: Primary | ICD-10-CM
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
## 2024-06-19 PROCEDURE — 99283 EMERGENCY DEPT VISIT LOW MDM: CPT | Performed by: PHYSICIAN ASSISTANT

## 2024-06-19 PROCEDURE — 99283 EMERGENCY DEPT VISIT LOW MDM: CPT

## 2024-06-19 RX ORDER — BACITRACIN ZINC 500 UNIT/G
1 OINTMENT (GRAM) TOPICAL 2 TIMES DAILY
Qty: 14 G | Refills: 0 | Status: SHIPPED | OUTPATIENT
Start: 2024-06-19 | End: 2024-06-29

## 2024-06-19 RX ORDER — ACETAMINOPHEN 500 MG
1000 TABLET ORAL EVERY 8 HOURS PRN
Qty: 30 TABLET | Refills: 0 | Status: SHIPPED | OUTPATIENT
Start: 2024-06-19 | End: 2024-06-29

## 2024-06-19 ASSESSMENT — PAIN DESCRIPTION - ORIENTATION: ORIENTATION: RIGHT

## 2024-06-19 ASSESSMENT — LIFESTYLE VARIABLES
EVER HAD A DRINK FIRST THING IN THE MORNING TO STEADY YOUR NERVES TO GET RID OF A HANGOVER: NO
TOTAL SCORE: 0
HAVE PEOPLE ANNOYED YOU BY CRITICIZING YOUR DRINKING: NO
HAVE YOU EVER FELT YOU SHOULD CUT DOWN ON YOUR DRINKING: NO
EVER FELT BAD OR GUILTY ABOUT YOUR DRINKING: NO

## 2024-06-19 ASSESSMENT — COLUMBIA-SUICIDE SEVERITY RATING SCALE - C-SSRS
6. HAVE YOU EVER DONE ANYTHING, STARTED TO DO ANYTHING, OR PREPARED TO DO ANYTHING TO END YOUR LIFE?: NO
1. IN THE PAST MONTH, HAVE YOU WISHED YOU WERE DEAD OR WISHED YOU COULD GO TO SLEEP AND NOT WAKE UP?: NO
2. HAVE YOU ACTUALLY HAD ANY THOUGHTS OF KILLING YOURSELF?: NO

## 2024-06-19 ASSESSMENT — PAIN DESCRIPTION - LOCATION: LOCATION: LEG

## 2024-06-19 ASSESSMENT — PAIN - FUNCTIONAL ASSESSMENT: PAIN_FUNCTIONAL_ASSESSMENT: 0-10

## 2024-06-19 ASSESSMENT — PAIN SCALES - GENERAL: PAINLEVEL_OUTOF10: 8

## 2024-06-19 NOTE — ED PROVIDER NOTES
Emergency Department Encounter  [facility] EMERGENCY MEDICINE    Patient: John Deutsch  MRN: [id number]  : 1945  Date of Evaluation: 2024  ED Provider: Hedy Rothman PA-C      Chief Complaint       Chief Complaint   Patient presents with    Burn     HPI    John Deutsch is a 79 y.o. female who presents to the emergency department presenting for burn to her right knee.  Patient states that she was boiling hot water and went to dump it into a bucket when she accidentally got it on her right leg.  Has been able to bear weight and ambulate since the incident occurred, not using assistive devices.  No associated numbness or tingling.  No open wounds or lacerations.  Has not attempted any interventions prior to ED evaluation.  No other injury sustained.    ROS:     Review of Systems  14 systems reviewed and otherwise acutely negative except as in the HPI.    Past History     Past Medical History:   Diagnosis Date    Age-related nuclear cataract, bilateral 2016    Age-related nuclear cataract of both eyes    Age-related nuclear cataract, unspecified eye 2016    Nuclear sclerosis    Combined forms of age-related cataract, right eye 2022    Combined form of age-related cataract, right eye    Crohn's disease, unspecified, without complications (Multi) 2016    Crohn disease    Crohn's disease, unspecified, without complications (Multi) 2013    Crohn's disease    Drusen (degenerative) of macula, bilateral 2016    Drusen (degenerative) of macula, bilateral    Dry eye syndrome of unspecified lacrimal gland 2016    Dry eye syndrome    Encounter for general adult medical examination without abnormal findings 2022    Medicare annual wellness visit, subsequent    Nonexudative age-related macular degeneration, bilateral, stage unspecified 2016    Age-related macular degeneration, dry, both eyes    Other abnormal and inconclusive findings on  diagnostic imaging of breast 2013    Mammogram abnormal    Other conditions influencing health status     Congenital Stenosis Of Large Intestine    Other specified noninflammatory disorders of uterus     Fibrosis of uterus    Personal history of diseases of the skin and subcutaneous tissue 2014    History of urticaria    Personal history of other diseases of the respiratory system 2014    History of acute bronchitis    Personal history of urinary (tract) infections 2014    History of urinary tract infection    Presbyopia 2016    Presbyopia OU    Presence of intraocular lens 2022    Pseudophakia of left eye    Stenosis of anus and rectum     Rectal stricture    Unspecified astigmatism, bilateral 2016    Astigmatism, bilateral     Past Surgical History:   Procedure Laterality Date     SECTION, CLASSIC  10/02/2013     Section    COLONOSCOPY  10/02/2013    Complete Colonoscopy    OTHER SURGICAL HISTORY  10/02/2013    Colonoscopy (Fiberoptic) Forceps Biopsy    OTHER SURGICAL HISTORY  10/02/2013    Proctosigmoidoscopy (Rigid - Diagnostic)    OTHER SURGICAL HISTORY  10/02/2013    Ileoanal Pouch Fistula Repair Transperin & Transabd Approach     Social History     Socioeconomic History    Marital status:      Spouse name: None    Number of children: None    Years of education: None    Highest education level: None   Occupational History    None   Tobacco Use    Smoking status: Never    Smokeless tobacco: Never   Substance and Sexual Activity    Alcohol use: Never    Drug use: Never    Sexual activity: None   Other Topics Concern    None   Social History Narrative    None     Social Determinants of Health     Financial Resource Strain: Not on file   Food Insecurity: Not on file   Transportation Needs: Not on file   Physical Activity: Not on file   Stress: Not on file   Social Connections: Not on file   Intimate Partner Violence: Not on file   Housing Stability:  Not on file       Medications/Allergies     Previous Medications    AMLODIPINE (NORVASC) 10 MG TABLET    Take 1 tablet (10 mg) by mouth once daily.    ASCORBIC ACID (VITAMIN C) 500 MG TABLET    Take 1 tablet (500 mg) by mouth once daily.    CETIRIZINE (ZYRTEC) 10 MG TABLET    Take 1 tablet (10 mg) by mouth once daily.    CHOLECALCIFEROL (VITAMIN D-3) 25 MCG (1000 UT) TABLET    Take 2 tablets (2,000 Units) by mouth once daily.    DICLOFENAC SODIUM (VOLTAREN) 1 % GEL GEL    Apply 1 Application topically 4 times a day.    LIDOCAINE 4 % PATCH    USE AS DIRECTED ON PACKAGE    PANTOPRAZOLE (PROTONIX) 40 MG EC TABLET    Take 1 tablet (40 mg) by mouth once daily.     Allergies   Allergen Reactions    Hydrochlorothiazide Unknown    Iodinated Contrast Media Dizziness and Syncope     PASSED OUT    Iodine Unknown    Lisinopril Angioedema    Oxycodone-Aspirin Unknown    Oxycodone Palpitations, Other and Dizziness     CONFUSION  TACHYCARDIA        Physical Exam       ED Triage Vitals [06/19/24 1045]   Temperature Heart Rate Respirations BP   36.1 °C (97 °F) 76 16 135/78      Pulse Ox Temp src Heart Rate Source Patient Position   94 % -- -- --      BP Location FiO2 (%)     -- --         Physical Exam    Physical Exam:     VS: As documented in the triage note and EMR flowsheet from this visit were reviewed.    Appearance: Alert, oriented, cooperative, in no acute distress. Well nourished & well hydrated.    Skin: Atraumatic. Warm, intact and dry. No lesions, rash, or petechiae.    Neck: Supple, without lymphadenopathy.     Pulmonary: Clear bilaterally with good chest wall excursion. No rales, rhonchi or wheezing. No accessory muscle use or stridor.    Cardiac: Normal S1, S2    Musculoskeletal: Spontaneously moving all extremities. No midline tenderness. Extremities warm and well-perfused, capillary refill less than 2 seconds. Pulses full and equal. Well circumscribed erythema and edema to anterior R knee, extending down lateral RLE  "to mid-tibia. No crepitus, ecchymosis. Full AROM R knee. No deformity noted.    Neurological: Normal sensation, no weakness, no focal findings identified.    Diagnostics   Not applicable      ED Course   Visit Vitals  /78   Pulse 76   Temp 36.1 °C (97 °F)   Resp 16   Ht 1.499 m (4' 11\")   Wt 65.8 kg (145 lb)   SpO2 94%   BMI 29.29 kg/m²   Smoking Status Never   BSA 1.66 m²     Medications - No data to display    Medical Decision Making     Diagnoses as of 06/19/24 1118   Burn due to contact with hot water   Benign-appearing first-degree burn of the patient's affected right knee.  Full active range of motion.  Tetanus was up-to-date in 2018.  Prescriptions for Tylenol as needed for pain, topical bacitracin are sent to her preferred pharmacy.  Instructed to follow-up with PCP within the next few days for wound check.      Final Impression      1. Burn due to contact with hot water          DISPOSITION  Disposition: discharge  Patient condition is: Stable    Comment: Please note this report has been produced using speech recognition software and may contain errors related to that system including errors in grammar, punctuation, and spelling, as well as words and phrases that may be inappropriate.  If there are any questions or concerns please feel free to contact the dictating provider for clarification.    YESSICA Nick PA-C  06/19/24 1120    "

## 2024-07-30 ENCOUNTER — APPOINTMENT (OUTPATIENT)
Dept: PRIMARY CARE | Facility: CLINIC | Age: 79
End: 2024-07-30
Payer: COMMERCIAL

## 2024-07-30 VITALS
TEMPERATURE: 97.3 F | DIASTOLIC BLOOD PRESSURE: 83 MMHG | WEIGHT: 153.9 LBS | SYSTOLIC BLOOD PRESSURE: 130 MMHG | OXYGEN SATURATION: 95 % | HEIGHT: 59 IN | BODY MASS INDEX: 31.03 KG/M2 | HEART RATE: 77 BPM

## 2024-07-30 DIAGNOSIS — Z00.00 HEALTH CARE MAINTENANCE: Primary | ICD-10-CM

## 2024-07-30 DIAGNOSIS — K50.90 CROHN'S DISEASE WITHOUT COMPLICATION, UNSPECIFIED GASTROINTESTINAL TRACT LOCATION (MULTI): ICD-10-CM

## 2024-07-30 DIAGNOSIS — Z78.0 ENCOUNTER FOR OSTEOPOROSIS SCREENING IN ASYMPTOMATIC POSTMENOPAUSAL PATIENT: ICD-10-CM

## 2024-07-30 DIAGNOSIS — Z13.820 ENCOUNTER FOR OSTEOPOROSIS SCREENING IN ASYMPTOMATIC POSTMENOPAUSAL PATIENT: ICD-10-CM

## 2024-07-30 ASSESSMENT — ENCOUNTER SYMPTOMS
LOSS OF SENSATION IN FEET: 1
OCCASIONAL FEELINGS OF UNSTEADINESS: 0
DEPRESSION: 0

## 2024-07-30 ASSESSMENT — PATIENT HEALTH QUESTIONNAIRE - PHQ9
2. FEELING DOWN, DEPRESSED OR HOPELESS: NOT AT ALL
1. LITTLE INTEREST OR PLEASURE IN DOING THINGS: NOT AT ALL
SUM OF ALL RESPONSES TO PHQ9 QUESTIONS 1 AND 2: 0

## 2024-07-30 ASSESSMENT — PAIN SCALES - GENERAL: PAINLEVEL: 0-NO PAIN

## 2024-07-30 NOTE — PROGRESS NOTES
Subjective   Patient ID: John Deutsch is a 79 y.o. female with PMH of Crohn's, HTN, and GERD who presents for Follow-up.      - Patient's rating of their own health: Fair    #Crohn's  -Last Colonoscopy , due in 3-5 yrs  -Reports increased bloating. Reports certain foods trigger symptoms  -1 BM in last week  -Has taken miralax in the past which has been helpful  -Follows Dr. Brenda BROOKE but has not seen in awhile    #Hearing changes  -typically d/t cerumen impaction in the past  -has not had a formal hearing test in a while    #Hoarseness  -reports c/o hoarseness  -not consistent with water intake  -tries to 3-4 bottles per day (16oz) but sometimes goes all day with little water intake    #HM   - Dental Care: last prior dental visit need to establish // brushes teeth twice daily // flosses teeth couple times a week // denies current tooth pain  - Vision: last prior ophtho visit year ago // corrective devices: glasses //   - Hearing: denies recent hearing loss   - Diet: hamburger, spaghetti sauce, chicken. Juan mostly, use vegetable oil. Limited vegetable intake. Likes fruit  - Exercise:  Walk some, helps at   - Weight: increasing,   - Sleep: 6 hours  - Smoking: never a smoker  - EtOH: Alcohol Use: No, patient does not drink alcohol.  - Illicit substances: denied.  - Employment: Retired, does help help with care giving, works at [employer] . Involved with [employer].   - Living Situation: Live with son  - Colon CA: last prior screening Colonoscopy in Aug 2022 // family h/o colon ca? No    WOMEN  - Menstrual Status: Menopause  - Pregnancy history:   - Bladder dysfunction? has not had any episodes of incontinence        Current Outpatient Medications   Medication Instructions    amLODIPine (NORVASC) 10 mg, oral, Daily    ascorbic acid (Vitamin C) 500 mg tablet 1 tablet, oral, Daily    cetirizine (ZyrTEC) 10 mg tablet 1 tablet, oral, Daily    cholecalciferol (VITAMIN D-3) 2,000 Units, oral, Daily    diclofenac  "sodium (Voltaren) 1 % gel gel 1 Application, Topical, 4 times daily    lidocaine 4 % patch USE AS DIRECTED ON PACKAGE    pantoprazole (PROTONIX) 40 mg, oral, Daily       Objective     Vitals: /83 (BP Location: Left arm, Patient Position: Sitting, BP Cuff Size: Adult)   Pulse 77   Temp 36.3 °C (97.3 °F) (Temporal)   Ht 1.499 m (4' 11\")   Wt 69.8 kg (153 lb 14.4 oz)   SpO2 95%   BMI 31.08 kg/m²    Physical Exam  Constitutional:       General: She is not in acute distress.  HENT:      Head: Normocephalic and atraumatic.      Right Ear: There is no impacted cerumen.      Left Ear: Tympanic membrane normal. There is no impacted cerumen.      Mouth/Throat:      Mouth: Mucous membranes are dry.   Eyes:      Conjunctiva/sclera: Conjunctivae normal.   Cardiovascular:      Rate and Rhythm: Normal rate and regular rhythm.      Heart sounds: No murmur heard.  Pulmonary:      Effort: Pulmonary effort is normal.      Breath sounds: No wheezing or rhonchi.   Abdominal:      General: There is distension (moderatly distended).      Palpations: There is no mass.      Tenderness: There is no abdominal tenderness. There is no guarding.   Musculoskeletal:      Right lower leg: No edema.      Left lower leg: No edema.   Skin:     General: Skin is warm and dry.   Neurological:      General: No focal deficit present.      Mental Status: She is alert.   Psychiatric:         Mood and Affect: Mood normal.         Behavior: Behavior normal.           Assessment/Plan   John Deutsch is a 79 y.o. female with PMH of of Crohn's, HTN, and GERD  who presents for follow up.       Problem List Items Addressed This Visit       Crohn's disease (Multi)    Relevant Orders    Referral to Gastroenterology     Other Visit Diagnoses       Health care maintenance    -  Primary    Relevant Orders    Referral to Ophthalmology    Referral to ENT    XR DEXA bone density    Referral to Audiology    Encounter for osteoporosis screening in asymptomatic " postmenopausal patient        Relevant Orders    XR DEXA bone density            Attending Supervision: discussed with attending physician (cosigner listed on this note).    RTC in 1 month for Medicare Wellness Exam    Reviewed and approved by RADHA CANCHOLA on 7/30/24 at 4:24 PM.

## 2024-07-30 NOTE — PATIENT INSTRUCTIONS
It was so great to see you today John Deutsch  . Today we discussed:        Call [phone]  For Care if you need to schedule appointment with specialist or images.  IF any labs were ordered today, I will CALL if labs are ABNORMAL. If labs are NORMAL then I will comment on MyChart and mail results to you.    Follow up in       You were see by:    Dr. Reba Carlos D.O.  [facility]   Phone: [phone]

## 2024-08-01 ENCOUNTER — TELEPHONE (OUTPATIENT)
Dept: GASTROENTEROLOGY | Facility: HOSPITAL | Age: 79
End: 2024-08-01
Payer: COMMERCIAL

## 2024-08-09 ENCOUNTER — APPOINTMENT (OUTPATIENT)
Dept: AUDIOLOGY | Facility: HOSPITAL | Age: 79
End: 2024-08-09
Payer: COMMERCIAL

## 2024-08-22 ENCOUNTER — APPOINTMENT (OUTPATIENT)
Dept: RADIOLOGY | Facility: CLINIC | Age: 79
End: 2024-08-22
Payer: COMMERCIAL

## 2024-08-26 ENCOUNTER — EVALUATION (OUTPATIENT)
Dept: SPEECH THERAPY | Facility: HOSPITAL | Age: 79
End: 2024-08-26
Payer: COMMERCIAL

## 2024-08-26 ENCOUNTER — OFFICE VISIT (OUTPATIENT)
Dept: OTOLARYNGOLOGY | Facility: HOSPITAL | Age: 79
End: 2024-08-26
Payer: COMMERCIAL

## 2024-08-26 VITALS — TEMPERATURE: 97 F | HEIGHT: 59 IN | WEIGHT: 153 LBS | BODY MASS INDEX: 30.84 KG/M2

## 2024-08-26 DIAGNOSIS — R49.8 OTHER VOICE AND RESONANCE DISORDERS: ICD-10-CM

## 2024-08-26 DIAGNOSIS — K21.9 LARYNGOPHARYNGEAL REFLUX (LPR): ICD-10-CM

## 2024-08-26 DIAGNOSIS — R47.89 BREATHY VOICE QUALITY: ICD-10-CM

## 2024-08-26 DIAGNOSIS — R49.0 VOICE HOARSENESS: ICD-10-CM

## 2024-08-26 DIAGNOSIS — J38.3 GLOTTIC INSUFFICIENCY: ICD-10-CM

## 2024-08-26 DIAGNOSIS — Z00.00 HEALTH CARE MAINTENANCE: ICD-10-CM

## 2024-08-26 DIAGNOSIS — R49.0 DYSPHONIA: Primary | ICD-10-CM

## 2024-08-26 DIAGNOSIS — Z78.9 DOES NOT USE RESPIRATORY SUPPORT FOR VOICE: Primary | ICD-10-CM

## 2024-08-26 DIAGNOSIS — J38.01 PARESIS OF RIGHT VOCAL FOLD: ICD-10-CM

## 2024-08-26 PROCEDURE — 31579 LARYNGOSCOPY TELESCOPIC: CPT | Performed by: OTOLARYNGOLOGY

## 2024-08-26 PROCEDURE — 99203 OFFICE O/P NEW LOW 30 MIN: CPT | Performed by: OTOLARYNGOLOGY

## 2024-08-26 PROCEDURE — 99213 OFFICE O/P EST LOW 20 MIN: CPT | Mod: 25 | Performed by: OTOLARYNGOLOGY

## 2024-08-26 PROCEDURE — 92524 BEHAVRAL QUALIT ANALYS VOICE: CPT | Mod: GN | Performed by: SPEECH-LANGUAGE PATHOLOGIST

## 2024-08-26 RX ORDER — MAGNESIUM CARB/ALUMINUM HYDROX 105-160MG
2 TABLET,CHEWABLE ORAL 4 TIMES DAILY
Qty: 240 TABLET | Refills: 3 | Status: SHIPPED | OUTPATIENT
Start: 2024-08-26

## 2024-08-26 ASSESSMENT — PAIN SCALES - GENERAL: PAINLEVEL_OUTOF10: 0 - NO PAIN

## 2024-08-26 ASSESSMENT — PAIN - FUNCTIONAL ASSESSMENT: PAIN_FUNCTIONAL_ASSESSMENT: 0-10

## 2024-08-26 NOTE — PROGRESS NOTES
ASSESSMENT AND PLAN:   John Deutsch is a 79 y.o. female presenting for an initial visit with findings of hoarse voice.  Patient has a history of vocal changes for some time. She has mild dysphagia but does not feel this is a problem.     Physical exam reveals weakness of R>L VC but primary concern is poor coordination of airflow. Recommended that she use Gaviscon at bedtime and perform speech therapy with LINDA to improve coordination. Follow-up prn.           Reason For Consult  Chief Complaint   Patient presents with    Follow-up     Throat review          HISTORY OF PRESENT ILLNESS:  John Deutsch is a 79 y.o. female presenting for an initial visit with me for hoarseness.      The patient reports her voice today is slightly hoarse today. If she elevates volume she coughs. Vocal fatigue with use.     PMH included Crohns, HTN, and GERD. Patient report mild hoarseness for about 1 month that occurs intermittently in both the morning and afternoon. It can occur anytime. She notes mild dysphagia to both solids and liquids but this is not new or changed. When she speaks loudly or is around smoke she will cough. Patient denies recent trauma. Patient reports some recent epistaxis and about that time she would cough I[ blood clots. She did not think this is associated to the hoarseness.     Patient is retired from retail.   She has never smoked and does not drink alcohol.     Patient denies fever and chills. She has nausea intermittently and had nausea this morning. She denies fatigue, general weakness, lightheadedness, dizziness, and no changes in vision.        Past Medical History  She has a past medical history of Age-related nuclear cataract, bilateral (02/01/2016), Age-related nuclear cataract, unspecified eye (08/01/2016), Combined forms of age-related cataract, right eye (12/09/2022), Crohn's disease, unspecified, without complications (Multi) (08/01/2016), Crohn's disease, unspecified, without complications  (Multi) (2013), Drusen (degenerative) of macula, bilateral (2016), Dry eye syndrome of unspecified lacrimal gland (2016), Encounter for general adult medical examination without abnormal findings (2022), Nonexudative age-related macular degeneration, bilateral, stage unspecified (2016), Other abnormal and inconclusive findings on diagnostic imaging of breast (2013), Other conditions influencing health status, Other specified noninflammatory disorders of uterus, Personal history of diseases of the skin and subcutaneous tissue (2014), Personal history of other diseases of the respiratory system (2014), Personal history of urinary (tract) infections (2014), Presbyopia (2016), Presence of intraocular lens (2022), Stenosis of anus and rectum, and Unspecified astigmatism, bilateral (2016). Surgical History  She has a past surgical history that includes Colonoscopy (10/02/2013); Other surgical history (10/02/2013);  section, classic (10/02/2013); Other surgical history (10/02/2013); and Other surgical history (10/02/2013).   Social History  She reports that she has never smoked. She has never used smokeless tobacco. She reports that she does not drink alcohol and does not use drugs. Allergies  Hydrochlorothiazide, Iodinated contrast media, Iodine, Lisinopril, Oxycodone-aspirin, and Oxycodone     Family History  Family History   Problem Relation Name Age of Onset    Breast cancer Sister      Stroke Brother      Diabetes Daughter      Obesity Son      Diabetes type II Son      Crohn's disease Other FAMILY HISTORY         Review of Systems  All 10 systems were reviewed and negative except for above.      Physical Exam    ENT Physical Exam   ENT Physical Exam  Constitutional  Appearance: patient appears well-developed and well-nourished,  Head and Face  Appearance: head appears normal and face appears normal;  Ear  Auricles: right auricle normal;  "left auricle normal;  Nose  External Nose: nares patent bilaterally; spur on left  Oral Cavity/Oropharynx  Lips: normal;  Neck  Neck: neck normal; neck palpation normal;  Respiratory  Inspection: no retractions visible;  Cardiovascular  Inspection: no peripheral edema present;  Neurovestibular  Mental Status: alert and oriented;  Psychiatric: mood normal;  Cranial Nerves: cranial nerves intact;    Last Recorded Vitals  Temperature 36.1 °C (97 °F), height 1.499 m (4' 11\"), weight 69.4 kg (153 lb).    Relevant Results       Patient Reported Outcome Measures         Radiology, Laboratory and Pathology  No results found.      ----------------------------------------------------------------------  Procedures   Flexible Laryngoscopy w/ Videostroboscopy    VOICE AND SPEECH CHARACTERISTICS:  Normal spoken speech, (+) moderate dysphonia, no roughness, (+) mild breathiness, (+) moderate asthenia, (+) moderate strain.    Intelligibility: reduced.   Resonance: balanced.   Vocal Loudness: reduced.   Breath Support: decreased.    PROCEDURE:    Indications: voice change  PROCEDURE NOTE: FLEXIBLE LARYNGOSCOPY WITH STROBOSCOPY  I recommended a flexible laryngoscopy with stroboscopy based on PE findings, and/or concern for mucosal wave details based upon history and/or for issues associated with hyperreflexic gag on mirror exam concerning for pathology. Risks, benefits, and alternatives were explained. The patient wishes to proceed and gives verbal consent.   Patient is seated in the exam chair. After adequate topical anesthesia, I advance the flexible endoscope. The examination included evaluation of the rey, vallecula, base of tongue, pyriforms, post-cricoid area, larynx and immediate subglottis.    Reflux Finding Score  Subglottic edema: Absent   Thick Mucus: Present  Granuloma: Absent   Ventricular Obliteration: Partial   Erythema/Hyperemia: Arytenoid  IA Thickening: Mild   TVC Edema: Mild  Diffuse Laryngitis: Absent     Gross " Arytenoid Movement: limited adduction left.  Arytenoid Height: normal.   Supraglottic Tension: lateral.    Symmetry: asymmetry.   Amplitude: excessive: left.  Phase Closure: in-phase.  Periodicity: aperiodic.  Mucosal Wave Lateral Excursion/Secondary Wave: Bilateral Vocal Cord: no restriction - wave moved more than ½ the width of the vocal fold.    Closure: post. gap.    Time Spent  Prep time on day of patient encounter: 5-10 minutes  Time spent directly with patient, family or caregiver: 25 minutes  Additional Time Spent on Patient Care Activities/discuss care plan with SLP: 5 minutes  Documentation Time: 10 minutes  Other Time Spent: 0 minutes  Total: 50 minutes     Scribe Attestation  By signing my name below, I, Gabriela Taylor , [employer]   attest that this documentation has been prepared under the direction and in the presence of Duc Draper MD.

## 2024-08-26 NOTE — PROGRESS NOTES
Speech-Language Pathology    Voice Evaluation    Patient Name: John Deutsch  MRN: [id number]  Today's Date: 8/26/2024     Time Calculation  Start Time: 1000  Stop Time: 1030  Time Calculation (min): 30 min      Current Problem:  Patient Active Problem List   Diagnosis    Age-related macular degeneration, dry, both eyes    Allergic reaction    Alopecia    Nonscarring hair loss, unspecified    Anal stricture    Ankylosing spondylitis (Multi)    Abdominal pain    Bilateral hip pain    Broken tooth    Bilateral lower extremity edema    Burning pain    Heartburn    Carpal tunnel syndrome, bilateral    Cataract, nuclear sclerotic, both eyes    Cervical radiculopathy    Chest wall pain    Chronic urticaria    Closed fracture of nasal bone with delayed healing    Combined form of age-related cataract, left eye    Condyloma    Verrucae vulgaris    Viral wart, unspecified    Constipation    Crohn's disease (Multi)    Drusen of macula of both eyes    Dry eyes, bilateral    Dysphagia    Dysthymia    Fatigue    Female pelvic pain    Finger laceration    GERD (gastroesophageal reflux disease)    Headache    Hearing loss    History of anemia    Hypertension    Hypokalemia    Hyposmia    Inflammatory arthritis    Leiomyoma of uterus    Lichen planopilaris    Chronic bilateral low back pain without sciatica    Metabolic syndrome    Muscle strain    Obesity (BMI 30.0-34.9)    Osteopenia    Phlegm in throat    Right hand tendonitis    Seasonal allergic rhinitis    Seborrhea capitis    Shortness of breath on exertion    Sleeps in sitting position due to orthopnea    Status post small bowel resection    Subungual hematoma of right thumb    Swelling of lower extremity    Pain, dental    Ulnar neuropathy at elbow    Varicose veins with swelling    Vitamin D deficiency    Uterine polyp    Neck pain    Retinal drusen of both eyes    Small intestinal bacterial overgrowth    Facial swelling    Fall    Finger pain    Hypertensive heart disease  with heart failure (Multi)    Neuropathy    Rash    Shortness of breath    Tightness in chest    Cataract extraction status of eye, left    Swollen lip    Burn due to contact with hot water    Dysphonia    Glottic insufficiency       Voice Assessment:   John Deutsch is a 79 y.o. female presenting for an initial visit with findings of hoarse voice.  Patient has a history of vocal changes for some time. She has mild dysphagia but does not feel this is a problem.      Physical exam reveals weakness of R>L VC but primary concern is poor coordination of airflow. Recommended that she use Gaviscon at bedtime due to am mucus.     Completed direct voice therapy this date targeting mucus management and coordination of respiration/phonation. Given cues for diaphragmatic breathing, patient able to produce clear voicing at phrase level with >80% accuracy. Patient given handouts for continued practice and asked to follow-up as needed.      Voice Plan of Care:  Frequency: x1/eowk  Duration: x12 weeks  Number of Visits: 4-8  Recommendations for therapeutic interventions: Speech/Voice exercises, Vocal hygiene program, Oral resonance techniques, Habituation training, Vocal strengthening techniques  Prognosis: Good  Factors affecting prognosis: None  Discussed Plan of Care: Patient, Physician, Discussed risks/benefits with patient/caregiver, Patient/caregiver agreeable with Plan of Care      Subjective   Current Problem:   HISTORY OF PRESENT ILLNESS:  John Deutsch is a 79 y.o. female presenting for an initial visit with me for hoarseness.       The patient reports her voice today is slightly hoarse today. If she elevates volume she coughs. Vocal fatigue with use.      PMH included Crohns, HTN, and GERD. Patient report mild hoarseness for about 1 month that occurs intermittently in both the morning and afternoon. It can occur anytime. She notes mild dysphagia to both solids and liquids but this is not new or changed. When she speaks  loudly or is around smoke she will cough. Patient denies recent trauma. Patient reports some recent epistaxis and about that time she would cough I[ blood clots. She did not think this is associated to the hoarseness.      Patient is retired from retail.   She has never smoked and does not drink alcohol.      Patient denies fever and chills. She has nausea intermittently and had nausea this morning. She denies fatigue, general weakness, lightheadedness, dizziness, and no changes in vision.         Past Medical History  She has a past medical history of Age-related nuclear cataract, bilateral (2016), Age-related nuclear cataract, unspecified eye (2016), Combined forms of age-related cataract, right eye (2022), Crohn's disease, unspecified, without complications (Multi) (2016), Crohn's disease, unspecified, without complications (Multi) (2013), Drusen (degenerative) of macula, bilateral (2016), Dry eye syndrome of unspecified lacrimal gland (2016), Encounter for general adult medical examination without abnormal findings (2022), Nonexudative age-related macular degeneration, bilateral, stage unspecified (2016), Other abnormal and inconclusive findings on diagnostic imaging of breast (2013), Other conditions influencing health status, Other specified noninflammatory disorders of uterus, Personal history of diseases of the skin and subcutaneous tissue (2014), Personal history of other diseases of the respiratory system (2014), Personal history of urinary (tract) infections (2014), Presbyopia (2016), Presence of intraocular lens (2022), Stenosis of anus and rectum, and Unspecified astigmatism, bilateral (2016). Surgical History  She has a past surgical history that includes Colonoscopy (10/02/2013); Other surgical history (10/02/2013);  section, classic (10/02/2013); Other surgical history (10/02/2013); and Other  surgical history (10/02/2013).   Social History  She reports that she has never smoked. She has never used smokeless tobacco. She reports that she does not drink alcohol and does not use drugs. Allergies  Hydrochlorothiazide, Iodinated contrast media, Iodine, Lisinopril, Oxycodone-aspirin, and Oxycodone       General Visit Information:  Patient Class: Outpatient  Living Environment: Home  Arrival: Independent  Ordering Physician: Dr. Draper  Reason for Referral: weak voicing    Objective     Pain Assessment:  Pain Assessment: 0-10  0-10 (Numeric) Pain Score: 0 - No pain       Voice Use Inventory:  Voice misuse/abuse: None  Exposure to Noise: No  Exposure to respiratory irritants: No  Consistent use of singing voice: No  Occupation relies on speaking voice: No    Patient Self Assessment:  Daily water intake:  (varies)  Daily caffeine intake: Yes  Alcohol intake: Yes  Smoking history: No  Reflux history: Yes    Voice Objective:  Motor Speech Production: WFL  Pitch: Inadequate range  Voice Quality: Breathy, Hoarse, Weak  Fluency: WFL  Prosody: WFL  Resonance: WFL  Loudness: Inadequate range      Voice Analysis:   Flexible Laryngoscopy w/ Videostroboscopy     VOICE AND SPEECH CHARACTERISTICS:  Normal spoken speech, (+) moderate dysphonia, no roughness, (+) mild breathiness, (+) moderate asthenia, (+) moderate strain.     Intelligibility: reduced.   Resonance: balanced.   Vocal Loudness: reduced.   Breath Support: decreased.     PROCEDURE:    Indications: voice change  PROCEDURE NOTE: FLEXIBLE LARYNGOSCOPY WITH STROBOSCOPY  I recommended a flexible laryngoscopy with stroboscopy based on PE findings, and/or concern for mucosal wave details based upon history and/or for issues associated with hyperreflexic gag on mirror exam concerning for pathology. Risks, benefits, and alternatives were explained. The patient wishes to proceed and gives verbal consent.   Patient is seated in the exam chair. After adequate topical  anesthesia, I advance the flexible endoscope. The examination included evaluation of the rey, vallecula, base of tongue, pyriforms, post-cricoid area, larynx and immediate subglottis.     Reflux Finding Score  Subglottic edema: Absent   Thick Mucus: Present  Granuloma: Absent   Ventricular Obliteration: Partial   Erythema/Hyperemia: Arytenoid  IA Thickening: Mild   TVC Edema: Mild  Diffuse Laryngitis: Absent      Gross Arytenoid Movement: limited adduction left.  Arytenoid Height: normal.   Supraglottic Tension: lateral.     Symmetry: asymmetry.   Amplitude: excessive: left.  Phase Closure: in-phase.  Periodicity: aperiodic.  Mucosal Wave Lateral Excursion/Secondary Wave: Bilateral Vocal Cord: no restriction - wave moved more than ½ the width of the vocal fold.     Closure: post. gap.    Voice Treatment:  Individual(s) Educated: Patient  Verbal Education: Risks/benefits of therapy, Results of testing, Communication strategies  Response to Education: Verbalized understanding, Demonstrated understanding, Needs review  Patient/Caregiver Verbalized Understanding and Agreement: Yes      Active       Voice       SLP Goal 1       Start:  08/26/24    Expected End:  11/26/24       Patient will increase vocal wellness and decrease phonotrauma in adherence with clinician prescribed vocal hygiene and wellness program per patient report.          SLP Goal 2       Start:  08/26/24    Expected End:  11/26/24       Patient will increase ability to produce voice without tension within 5 minute conversational task x80% accuracy as judged by clinician observation and/or patient report.          SLP Goal 3       Start:  08/26/24    Expected End:  11/26/24       Patient will demonstrate independent use of voice/breathing techniques x80% accuracy.             Time In: 1000  Time Out: 1030

## 2024-08-27 ENCOUNTER — APPOINTMENT (OUTPATIENT)
Dept: PRIMARY CARE | Facility: CLINIC | Age: 79
End: 2024-08-27
Payer: COMMERCIAL

## 2024-09-04 ENCOUNTER — CLINICAL SUPPORT (OUTPATIENT)
Dept: AUDIOLOGY | Facility: HOSPITAL | Age: 79
End: 2024-09-04
Payer: COMMERCIAL

## 2024-09-04 DIAGNOSIS — Z00.00 HEALTH CARE MAINTENANCE: ICD-10-CM

## 2024-09-04 DIAGNOSIS — H90.3 ASYMMETRICAL SENSORINEURAL HEARING LOSS: Primary | ICD-10-CM

## 2024-09-04 PROCEDURE — 92550 TYMPANOMETRY & REFLEX THRESH: CPT

## 2024-09-04 PROCEDURE — 92557 COMPREHENSIVE HEARING TEST: CPT

## 2024-09-04 NOTE — Clinical Note
Hello!  Can someone please schedule this patient for a NPV with any ENT provider due to asymmetric SNHL and need for medical clearance for amplification.   Thank you!

## 2024-09-04 NOTE — PROGRESS NOTES
ADULT AUDIOLOGY EVALUATION    Name:  John Deutsch  :  1945  Age:  79 y.o.  Date of Evaluation:  2024    Time: 15:30-16:00    HISTORY     John Deutsch, 79 y.o. is seen today for an audiologic evaluation at the referral of Debora Isbell MD due to chief complaint of decreased hearing, bilaterally. Patient reported that she often knows people are speaking, but can't understand what they are saying. She stated that she has trouble hearing in noisy environments and that she sometimes has some ear pain that extends down her neck. Patient reported having bilateral tinnitus that is not bothersome and only occurs sometimes. She stated that she had previously had vertigo and was treated with medication. She feels that it is under control now, but sometimes still experiences dizziness when laying down or walking.      Patient denied aural fullness, recent ear infections, ear drainage, history of otologic surgeries or history of loud noise exposure.     EVALUATION         TEST RESULTS     Otoscopic Evaluation:  Right Ear: Clear ear canal with unremarkable tympanic membrane  Left Ear: Slight wax in ear canal with full visualization of tympanic membrane    Tympanometry:   Right Ear: Shallow, As tympanogram with normal ear canal volume, peak pressure and slightly restricted compliance.  Left Ear: Normal, type A tympanogram with normal ear canal volume, peak pressure and compliance.     Ipsilateral Acoustic Reflexes:   Right Ear: Present 500-4000 Hz.   Left Ear: Present 500-2000 Hz, absent 4000 Hz.     Pure Tone Audiometry (125-8000 Hz):   Right Ear: Mild sensorineural hearing loss 125-1000 Hz sloping to moderately-severe sensorineural hearing loss   Left Ear: Mild to moderate sensorineural hearing loss 125-2000 Hz sloping to profound sensorineural hearing loss (no response at 8000 Hz)    Speech Audiometry:   Right Ear:    Speech Reception Threshold (SRT) was obtained at 25 dBHL.   Word Recognition scores were good in  quiet when words were presented at 75 dBHL using NU-6 word ordered by difficulty.  Left Ear:    Speech Reception Threshold (SRT) was obtained at 25 dBHL.   Word Recognition scores were fair in quiet when words were presented at 75 dBHL using NU-6 word ordered by difficulty.       IMPRESSIONS     Today's test results indicate mild hearing loss 125-1000 Hz sloping to moderately-severe sensorineural hearing loss in the right ear and mild to moderate hearing loss 125-2000 Hz sloping to profound hearing loss in the left ear. Results and recommendations were discussed with the patient. Patient is considered a good candidate for amplification through hearing aids. Due to the asymmetrical status of her hearing, she is recommended to see an ENT for medical clearance for hearing aids. She was provided information regarding insurance coverage and encouraged to contact her insurance for potential benefit information. Referral to ENT made today following appointment.    RECOMMENDATIONS     Continue medical follow up with ENT.  Contact insurance regarding hearing aid coverage and pursue hearing aids through outside clinic.   Return for annual hearing evaluation, due 9/2025, or sooner should concerns arise.    ALEXANDRU Simon.  Audiology Student Extern    Marion Vallecillo, CCC-A  Licensed Clinical Audiologist     Degree of Hearing Sensitivity Decibel Range   Within Normal Limits (WNL) 0-25   Mild 26-40   Moderate 41-55   Moderately-Severe 56-70   Severe 71-90   Profound 91+      Key   CNT/DNT Could Not Test/Did Not Test   TM Tympanic Membrane   WNL Within Normal Limits   HA Hearing Aid   SNHL Sensorineural Hearing Loss   CHL Conductive Hearing Loss   NIHL Noise-Induced Hearing Loss   ECV Ear Canal Volume   MLV Monitored Live Voice

## 2024-09-16 NOTE — PROGRESS NOTES
REASON FOR VISIT:  Crohn's disease    HPI:  John Deutsch is a 79 y.o. female who presents for  annual follow-up.  Last seen 6/2023.  Crohn's disease s/p ileocolonic resection 1999. Postoperatively she was on Asacol for a long time, however, this was stopped 2010.     (+) significant perianal Crohn's disease with anal stenosis dating back into the 1990s.  Multiple EUAs with anal dilation  (+) chronic constipation.   (+) managed with intermittent dilation of IC anastomosis and also of anal stenosis.      8/2022 EGD with 3 cm hiatal hernia and atrophic gastritis. Biopsies showed chronic gastritis without H. pylori. Colonoscopy showed large perianal skin tags. There was a moderate anal stricture that was fibrotic and dilated with the index finger. There was mild inflammation with a few superficial ulcers and erythema in the neoterminal ileum just at and past the ileocolonic anastomosis;the remainder of the terminal ileum was normal. The ileocolonic anastomosis was healthy-appearing without stenosis. The colon was normal. No biopsies were taken.     Last seen 6/2023 and treated for GERD and SIBO with increase of pantoprazole from every day to BID and course of ciprofloxacin.    In follow-up today, the ciprofloxacin caused diarrhea and her bottom felt irritated, so she stopped the Cipro.  Took pantoprazole BID for a while, but now back on once daily.  Not sure helped.    She continues with bloating.  Stools depend on what eats.  Varies from 0-4 times daily.  Stool is formed.  Some constipation, but not as much as before.  More diarrhea now than has ever had.  Not able to eat all the foods she used to tolerate.  Using a lot of cait chews for pain in stomach, Pepto-Bismol for diarrhea-works after a couple of doses, and flour with water for diarrhea.   Weight is going  Lots of stress:  Granddaughter had brain surgery, needs to move (house condemned) and no place to move to on her limited budget.    Some occasional trouble  swallowing pills if takes a few at a time.  Food goes down OK  Occasionally will awaken and vomit.    REVIEW OF SYSTEMS   Some decreased hearing left ear.  She is seeing ENT.  Complete review of systems otherwise negative per complaint    Allergies   Allergen Reactions    Hydrochlorothiazide Unknown    Iodinated Contrast Media Dizziness and Syncope     PASSED OUT    Iodine Unknown    Lisinopril Angioedema    Oxycodone-Aspirin Unknown    Oxycodone Palpitations, Other and Dizziness     CONFUSION  TACHYCARDIA       Past Medical History:   Diagnosis Date    Age-related nuclear cataract, bilateral 02/01/2016    Age-related nuclear cataract of both eyes    Age-related nuclear cataract, unspecified eye 08/01/2016    Nuclear sclerosis    Combined forms of age-related cataract, right eye 12/09/2022    Combined form of age-related cataract, right eye    Crohn's disease, unspecified, without complications (Multi) 08/01/2016    Crohn disease    Crohn's disease, unspecified, without complications (Multi) 09/27/2013    Crohn's disease    Drusen (degenerative) of macula, bilateral 08/01/2016    Drusen (degenerative) of macula, bilateral    Dry eye syndrome of unspecified lacrimal gland 08/01/2016    Dry eye syndrome    Encounter for general adult medical examination without abnormal findings 07/08/2022    Medicare annual wellness visit, subsequent    Nonexudative age-related macular degeneration, bilateral, stage unspecified 02/01/2016    Age-related macular degeneration, dry, both eyes    Other abnormal and inconclusive findings on diagnostic imaging of breast 09/24/2013    Mammogram abnormal    Other conditions influencing health status     Congenital Stenosis Of Large Intestine    Other specified noninflammatory disorders of uterus     Fibrosis of uterus    Personal history of diseases of the skin and subcutaneous tissue 01/19/2014    History of urticaria    Personal history of other diseases of the respiratory system  2014    History of acute bronchitis    Personal history of urinary (tract) infections 2014    History of urinary tract infection    Presbyopia 2016    Presbyopia OU    Presence of intraocular lens 2022    Pseudophakia of left eye    Stenosis of anus and rectum     Rectal stricture    Unspecified astigmatism, bilateral 2016    Astigmatism, bilateral       Past Surgical History:   Procedure Laterality Date     SECTION, CLASSIC  10/02/2013     Section    COLONOSCOPY  10/02/2013    Complete Colonoscopy    OTHER SURGICAL HISTORY  10/02/2013    Colonoscopy (Fiberoptic) Forceps Biopsy    OTHER SURGICAL HISTORY  10/02/2013    Proctosigmoidoscopy (Rigid - Diagnostic)    OTHER SURGICAL HISTORY  10/02/2013    Ileoanal Pouch Fistula Repair Transperin & Transabd Approach       Current Outpatient Medications   Medication Sig Dispense Refill    aluminum hydrox-magnesium carb (Gaviscon Extra Strength) 160-105 mg tablet,chewable Chew 2 tablets 4 times a day. After meals and at bedtime. 240 tablet 3    amLODIPine (Norvasc) 10 mg tablet Take 1 tablet (10 mg) by mouth once daily. 90 tablet 3    ascorbic acid (Vitamin C) 500 mg tablet Take 1 tablet (500 mg) by mouth once daily.      cetirizine (ZyrTEC) 10 mg tablet Take 1 tablet (10 mg) by mouth once daily.      cholecalciferol (Vitamin D-3) 25 MCG (1000 UT) tablet Take 2 tablets (2,000 Units) by mouth once daily. 30 tablet 0    diclofenac sodium (Voltaren) 1 % gel gel Apply 1 Application topically 4 times a day. 100 g 1    lidocaine 4 % patch USE AS DIRECTED ON PACKAGE      pantoprazole (ProtoNix) 40 mg EC tablet Take 1 tablet (40 mg) by mouth once daily. 90 tablet 3     No current facility-administered medications for this visit.       PHYSICAL EXAM:  /78   Pulse 76   Temp 37 °C (98.6 °F)   Resp 19   Wt 70.9 kg (156 lb 4.8 oz)   SpO2 97%   BMI 31.57 kg/m²   Vital signs reviewed  Patient alert and oriented in no acute  distress  Anicteric  No cervical adenopathy  Cardiac exam regular rate and rhythm S1-S2 without murmurs gallops or rubs  Lungs clear to auscultation bilaterally  Abdomen soft; mild RLQ tenderness with some fullness; no without organomegaly or mass.  No rebound, but slight voluntary guarding RLQ.  Bowel sounds present  Extremities without edema  Patient declined perianal evaluation today      Lab Results   Component Value Date    WBC 7.1 01/11/2024    HGB 12.2 01/11/2024    HCT 33.2 (L) 01/11/2024    MCV 83 01/11/2024     01/11/2024     Lab Results   Component Value Date    ALT 8 02/06/2023    AST 16 02/06/2023    ALKPHOS 94 02/06/2023    BILITOT 0.5 02/06/2023       === 11/28/23 ===    CT CARDIAC SCORING WO IV CONTRAST    - Impression -  1. Coronary artery calcium score of 10.7*.    *Coronary Artery  Agatston score    Score  risk  Very low 1-99  Mildly increased 100-299  Moderately increased >300  Moderate to severely increased >800    Parth et al. JCCT 2016 (http://dx.doi.org/10.1016/j.[email or web address])    LEDESMA 10-Year CHD Risk with Coronary Artery Calcification can be  calcuate using link below  [email or web address]  Jam wilson al. JACC 2015 (http://dx.[email or web address]/10.1016/j.j  [email or web address])    Signed by: Meño Hill 11/28/2023 10:02 AM  Dictation workstation:   [id number]      ASSESSMENT  #Crohn's disease- longstanding perianal and ileocolonic Crohn's disease status post remote resection.  She has been off medications for quite some time with only mild recurrence at the ileocolonic anastomosis that we have been managed with intermittent colonoscopy and anastomotic dilation as needed along with anal dilation.  She has some chronic GI symptoms, but I am uncertain that these are related to Crohn's disease activity.  I think there is more of a flavor of irritable bowel syndrome given her multiple life stressors related to finances and housing.    Will go  ahead and check labs and a stool fecal calprotectin to see if any disease activity.  If there is no disease activity, we will plan a colonoscopy for about a year from now.  However, if there is evidence of active disease, will need to adjust her treatment plans and probably plan for earlier endoscopic reevaluation. Pending these results and evaluation she will manage symptoms with her over-the-counter remedies that she has been doing.    # Anemia-she had a mild anemia 6 months ago.  We will repeat labs and iron studies.    PLAN  1) check labs  2) check stool fecal calprotectin  3) Dr. Gutierrez will contact you with the test results and discuss if anything different needs to be done in terms of management  4) tentatively, we will plan for colonoscopy in about 1 year  5) as long as symptoms are stable, follow-up in the office in 1 year; if you are having more GI symptoms, please contact the office

## 2024-09-17 ENCOUNTER — OFFICE VISIT (OUTPATIENT)
Dept: GASTROENTEROLOGY | Facility: HOSPITAL | Age: 79
End: 2024-09-17
Payer: COMMERCIAL

## 2024-09-17 VITALS
RESPIRATION RATE: 19 BRPM | DIASTOLIC BLOOD PRESSURE: 78 MMHG | SYSTOLIC BLOOD PRESSURE: 124 MMHG | OXYGEN SATURATION: 97 % | TEMPERATURE: 98.6 F | BODY MASS INDEX: 31.57 KG/M2 | HEART RATE: 76 BPM | WEIGHT: 156.3 LBS

## 2024-09-17 DIAGNOSIS — K50.90 CROHN'S DISEASE WITHOUT COMPLICATION, UNSPECIFIED GASTROINTESTINAL TRACT LOCATION: ICD-10-CM

## 2024-09-17 DIAGNOSIS — D50.0 ANEMIA DUE TO GASTROINTESTINAL BLOOD LOSS: Primary | ICD-10-CM

## 2024-09-17 PROCEDURE — 99214 OFFICE O/P EST MOD 30 MIN: CPT | Performed by: INTERNAL MEDICINE

## 2024-09-17 PROCEDURE — 1125F AMNT PAIN NOTED PAIN PRSNT: CPT | Performed by: INTERNAL MEDICINE

## 2024-09-17 PROCEDURE — 3078F DIAST BP <80 MM HG: CPT | Performed by: INTERNAL MEDICINE

## 2024-09-17 PROCEDURE — 3074F SYST BP LT 130 MM HG: CPT | Performed by: INTERNAL MEDICINE

## 2024-09-17 PROCEDURE — 1159F MED LIST DOCD IN RCRD: CPT | Performed by: INTERNAL MEDICINE

## 2024-09-17 RX ORDER — ACETAMINOPHEN 500 MG
1000 TABLET ORAL EVERY 6 HOURS PRN
COMMUNITY

## 2024-09-17 ASSESSMENT — PAIN SCALES - GENERAL: PAINLEVEL: 6

## 2024-09-17 NOTE — PATIENT INSTRUCTIONS
Thank you for coming in for follow-up today.  As we discussed, I am not certain that your symptoms are related to worsening Crohn's disease, but we will do some evaluation to investigate this.  As reviewed today:    PLAN  1) check labs  2) check stool fecal calprotectin  3) Dr. Gutierrez will contact you with the test results and discuss if anything different needs to be done in terms of management  4) tentatively, we will plan for colonoscopy in about 1 year  5) as long as symptoms are stable, follow-up in the office in 1 year; if you are having more GI symptoms, please contact the office

## 2024-10-01 ENCOUNTER — APPOINTMENT (OUTPATIENT)
Dept: PRIMARY CARE | Facility: CLINIC | Age: 79
End: 2024-10-01
Payer: COMMERCIAL

## 2024-10-01 VITALS
BODY MASS INDEX: 31.21 KG/M2 | TEMPERATURE: 97.8 F | OXYGEN SATURATION: 94 % | WEIGHT: 154.8 LBS | SYSTOLIC BLOOD PRESSURE: 133 MMHG | HEART RATE: 78 BPM | DIASTOLIC BLOOD PRESSURE: 78 MMHG | HEIGHT: 59 IN

## 2024-10-01 DIAGNOSIS — Z59.819 HOUSING INSTABILITY: ICD-10-CM

## 2024-10-01 DIAGNOSIS — Z00.00 MEDICARE ANNUAL WELLNESS VISIT, SUBSEQUENT: Primary | ICD-10-CM

## 2024-10-01 PROCEDURE — G0439 PPPS, SUBSEQ VISIT: HCPCS

## 2024-10-01 PROCEDURE — 1036F TOBACCO NON-USER: CPT

## 2024-10-01 PROCEDURE — 1126F AMNT PAIN NOTED NONE PRSNT: CPT

## 2024-10-01 PROCEDURE — 3078F DIAST BP <80 MM HG: CPT

## 2024-10-01 PROCEDURE — 3075F SYST BP GE 130 - 139MM HG: CPT

## 2024-10-01 PROCEDURE — 1159F MED LIST DOCD IN RCRD: CPT

## 2024-10-01 SDOH — ECONOMIC STABILITY - HOUSING INSECURITY: HOUSING INSTABILITY UNSPECIFIED: Z59.819

## 2024-10-01 ASSESSMENT — ENCOUNTER SYMPTOMS
OCCASIONAL FEELINGS OF UNSTEADINESS: 1
LOSS OF SENSATION IN FEET: 0
DEPRESSION: 0

## 2024-10-01 ASSESSMENT — PATIENT HEALTH QUESTIONNAIRE - PHQ9
1. LITTLE INTEREST OR PLEASURE IN DOING THINGS: NOT AT ALL
SUM OF ALL RESPONSES TO PHQ9 QUESTIONS 1 AND 2: 0
2. FEELING DOWN, DEPRESSED OR HOPELESS: NOT AT ALL

## 2024-10-01 ASSESSMENT — PAIN SCALES - GENERAL: PAINLEVEL: 0-NO PAIN

## 2024-10-01 NOTE — PROGRESS NOTES
Subjective   Reason for Visit: John Deutsch is an 79 y.o. female here for a Medicare Wellness visit.     PMH of of Crohn's, HTN, and GERD     # Health Maintenance  - Dental Care: last prior dental visit has not returned // brushes teeth mostly 1-2x daily // flosses teeth sometimes // denies current tooth pain, but reports one tooth needs to be removed  - Vision: Next ophtho visit 2024 // corrective devices: none //  - Hearing: endorses mild hearing loss after seeing Audiology  - Diet: History of Crohns, lots of food can trigger diarrhea. Follows GI  - Exercise: Walks and does chores in the house but no formal exercises. Does have chair exercises from old physical therapist  - Weight: stable,   - Smoking:   - EtOH:   - Illicit substances:   - Employment: Retired, worked in retail for 20 yrs  - Living Situation: Son lives with patient but more sick. Patient is care giver  - Colon CA: last prior screening Colonoscopy in  // family h/o colon ca? No scheduled with GI in 1 yr for Crohn's dz    WOMEN  - Menstrual Status: Menopausal  - Pregnancy history:   - Bladder dysfunction? has not had any episodes of incontinence  - Cervical CA: last prior pap  // h/o abnormal pap? No  - Breast CA: last prior mammo  // h/o abnormal mammo? No    ADLs/AIDLs  Bathing: Are you able to bathe yourself independently? If not, what assistance do you require? Independently  Dressing: Do you have any difficulty putting on or taking off your clothes? Independently  Eating: Can you feed yourself without assistance? Are there any challenges you face while eating? Independently  Toileting: Are you able to use the toilet without help? Do you have any concerns about incontinence? Independent  Mobility: Can you move around your home safely? Do you use any mobility aids (e.g., walker, cane)? Ambulates without mobility aids  Transferring: Are you able to get in and out of bed or a chair independently? Do you require assistance?  Independent  Medications: in individual container      FamHx:  Family History   Problem Relation Name Age of Onset    Breast cancer Sister      Stroke Brother      Diabetes Daughter      Obesity Son      Diabetes type II Son      Crohn's disease Other FAMILY HISTORY        PMH:  Patient Active Problem List   Diagnosis    Age-related macular degeneration, dry, both eyes    Allergic reaction    Alopecia    Nonscarring hair loss, unspecified    Anal stricture    Ankylosing spondylitis    Abdominal pain    Bilateral hip pain    Broken tooth    Bilateral lower extremity edema    Burning pain    Heartburn    Carpal tunnel syndrome, bilateral    Cataract, nuclear sclerotic, both eyes    Cervical radiculopathy    Chest wall pain    Chronic urticaria    Closed fracture of nasal bone with delayed healing    Combined form of age-related cataract, left eye    Condyloma    Verrucae vulgaris    Viral wart, unspecified    Constipation    Crohn's disease (Multi)    Drusen of macula of both eyes    Dry eyes, bilateral    Dysphagia    Dysthymia    Fatigue    Female pelvic pain    Finger laceration    GERD (gastroesophageal reflux disease)    Headache    Hearing loss    History of anemia    Hypertension    Hypokalemia    Hyposmia    Inflammatory arthritis    Leiomyoma of uterus    Lichen planopilaris    Chronic bilateral low back pain without sciatica    Metabolic syndrome    Muscle strain    Obesity (BMI 30.0-34.9)    Osteopenia    Phlegm in throat    Right hand tendonitis    Seasonal allergic rhinitis    Seborrhea capitis    Shortness of breath on exertion    Sleeps in sitting position due to orthopnea    Status post small bowel resection    Subungual hematoma of right thumb    Swelling of lower extremity    Pain, dental    Ulnar neuropathy at elbow    Varicose veins with swelling    Vitamin D deficiency    Uterine polyp    Neck pain    Retinal drusen of both eyes    Small intestinal bacterial overgrowth    Facial swelling    Fall     Finger pain    Hypertensive heart disease with heart failure    Neuropathy    Rash    Shortness of breath    Tightness in chest    Cataract extraction status of eye, left    Swollen lip    Burn due to contact with hot water    Dysphonia    Glottic insufficiency     Past Medical History:   Diagnosis Date    Age-related nuclear cataract, bilateral 02/01/2016    Age-related nuclear cataract of both eyes    Age-related nuclear cataract, unspecified eye 08/01/2016    Nuclear sclerosis    Combined forms of age-related cataract, right eye 12/09/2022    Combined form of age-related cataract, right eye    Crohn's disease, unspecified, without complications (Multi) 08/01/2016    Crohn disease    Crohn's disease, unspecified, without complications (Multi) 09/27/2013    Crohn's disease    Drusen (degenerative) of macula, bilateral 08/01/2016    Drusen (degenerative) of macula, bilateral    Dry eye syndrome of unspecified lacrimal gland 08/01/2016    Dry eye syndrome    Encounter for general adult medical examination without abnormal findings 07/08/2022    Medicare annual wellness visit, subsequent    Nonexudative age-related macular degeneration, bilateral, stage unspecified 02/01/2016    Age-related macular degeneration, dry, both eyes    Other abnormal and inconclusive findings on diagnostic imaging of breast 09/24/2013    Mammogram abnormal    Other conditions influencing health status     Congenital Stenosis Of Large Intestine    Other specified noninflammatory disorders of uterus     Fibrosis of uterus    Personal history of diseases of the skin and subcutaneous tissue 01/19/2014    History of urticaria    Personal history of other diseases of the respiratory system 04/09/2014    History of acute bronchitis    Personal history of urinary (tract) infections 04/09/2014    History of urinary tract infection    Presbyopia 08/01/2016    Presbyopia OU    Presence of intraocular lens 12/09/2022    Pseudophakia of left eye     "Stenosis of anus and rectum     Rectal stricture    Unspecified astigmatism, bilateral 2016    Astigmatism, bilateral       SurgHx:  Past Surgical History:   Procedure Laterality Date     SECTION, CLASSIC  10/02/2013     Section    COLONOSCOPY  10/02/2013    Complete Colonoscopy    OTHER SURGICAL HISTORY  10/02/2013    Colonoscopy (Fiberoptic) Forceps Biopsy    OTHER SURGICAL HISTORY  10/02/2013    Proctosigmoidoscopy (Rigid - Diagnostic)    OTHER SURGICAL HISTORY  10/02/2013    Ileoanal Pouch Fistula Repair Transperin & Transabd Approach       Meds:  Current Outpatient Medications   Medication Instructions    acetaminophen (TYLENOL) 1,000 mg, oral, Every 6 hours PRN    amLODIPine (NORVASC) 10 mg, oral, Daily    ascorbic acid (Vitamin C) 500 mg tablet 1 tablet, oral, Daily    cetirizine (ZyrTEC) 10 mg tablet 1 tablet, oral, Daily    cholecalciferol (VITAMIN D-3) 2,000 Units, oral, Daily    lidocaine 4 % patch USE AS DIRECTED ON PACKAGE    pantoprazole (PROTONIX) 40 mg, oral, Daily       Patient care team:  Patient Care Team:  Reba Carlos DO as PCP - General (Family Medicine)     ROS:  10pt reviewed negative    Objective   Vitals:  /78 (BP Location: Right arm, Patient Position: Sitting, BP Cuff Size: Adult)   Pulse 78   Temp 36.6 °C (97.8 °F) (Temporal)   Ht 1.499 m (4' 11\")   Wt 70.2 kg (154 lb 12.8 oz)   SpO2 94%   BMI 31.27 kg/m²       Physical Exam  Constitutional:       General: She is not in acute distress.  HENT:      Head: Normocephalic and atraumatic.      Right Ear: Tympanic membrane normal.      Left Ear: Tympanic membrane normal.      Nose: Nose normal.   Eyes:      Conjunctiva/sclera: Conjunctivae normal.   Cardiovascular:      Rate and Rhythm: Normal rate and regular rhythm.      Heart sounds: No murmur heard.  Pulmonary:      Effort: Pulmonary effort is normal.      Breath sounds: No wheezing, rhonchi or rales.   Skin:     General: Skin is warm and dry. " "  Neurological:      General: No focal deficit present.      Mental Status: She is alert.   Psychiatric:         Mood and Affect: Mood normal.         Behavior: Behavior normal.         Assessment/Plan     79 y.o. female here for Medicare Annual Wellness Exam.    IMMUNIZATIONS  Immunization History   Administered Date(s) Administered    Moderna SARS-CoV-2 Vaccination 03/06/2021, 04/03/2021, 01/27/2022    Tdap vaccine, age 7 year and older (BOOSTRIX, ADACEL) 05/27/2018       Ability to perform ADLs: independent  Fall risk: low  Hearing impairment: mild  Home safety risk factors: Home Safety Risk Factors: None    - DECLINED Flu, RSV, & Prevnar vaccine although recommended annually,   - COVID vaccine: recommended completion of primary series and recommended boosters,   - HTN screening: wnl today  - Food Insecurity screen: neg  - Depression: PHQ-2 neg  - Last Dental: recommended follow up every 6mo Provided Dental Resources  - Last Eye exam: recommended follow up annually   - Osteoporosis (65+F): Active DEXA scan order and encouraged patient to schedule  - Patient reports she lives in \"condemned\" house but having trouble finding housing she can afford. Place SW referral    Problem List Items Addressed This Visit    None  Visit Diagnoses       Medicare annual wellness visit, subsequent    -  Primary    Housing instability        Relevant Orders    Referral to Social Work            Patient seen and discussed with attending physician Dr. Valles.    RTC in 3 months for HTN follow, or earlier as needed.    Reviewed and approved by RADHA CANCHOLA on 10/1/24 at 12:45 PM.       "

## 2024-10-01 NOTE — PATIENT INSTRUCTIONS
It was so great to see you today John Deutsch  . Today we discussed:    -Get DXA bone scan scheduled to check for osteopenia and/or osteoporosis  - Referral to  to help look for housing  - Encouraged chair exercises to help with strength      Call [phone]  For Care if you need to schedule appointment with specialist or images.  IF any labs were ordered today, I will CALL if labs are ABNORMAL. If labs are NORMAL then I will comment on MyChart and mail results to you.    Follow up in       You were see by:    Dr. Reba Carlos D.O.  [facility]   Phone: [phone]

## 2024-10-09 ENCOUNTER — OFFICE VISIT (OUTPATIENT)
Dept: OTOLARYNGOLOGY | Facility: HOSPITAL | Age: 79
End: 2024-10-09
Payer: COMMERCIAL

## 2024-10-09 VITALS
HEART RATE: 85 BPM | DIASTOLIC BLOOD PRESSURE: 77 MMHG | BODY MASS INDEX: 32.15 KG/M2 | SYSTOLIC BLOOD PRESSURE: 127 MMHG | WEIGHT: 159.17 LBS

## 2024-10-09 DIAGNOSIS — H93.13 TINNITUS OF BOTH EARS: ICD-10-CM

## 2024-10-09 DIAGNOSIS — H90.3 SENSORINEURAL HEARING LOSS (SNHL) OF BOTH EARS: ICD-10-CM

## 2024-10-09 DIAGNOSIS — H90.3 ASYMMETRIC SNHL (SENSORINEURAL HEARING LOSS): Primary | ICD-10-CM

## 2024-10-09 DIAGNOSIS — H90.3 ASYMMETRIC SNHL (SENSORINEURAL HEARING LOSS): ICD-10-CM

## 2024-10-09 PROCEDURE — 3078F DIAST BP <80 MM HG: CPT | Performed by: OTOLARYNGOLOGY

## 2024-10-09 PROCEDURE — 1159F MED LIST DOCD IN RCRD: CPT | Performed by: OTOLARYNGOLOGY

## 2024-10-09 PROCEDURE — 99214 OFFICE O/P EST MOD 30 MIN: CPT | Performed by: OTOLARYNGOLOGY

## 2024-10-09 PROCEDURE — 3074F SYST BP LT 130 MM HG: CPT | Performed by: OTOLARYNGOLOGY

## 2024-10-09 PROCEDURE — 1160F RVW MEDS BY RX/DR IN RCRD: CPT | Performed by: OTOLARYNGOLOGY

## 2024-10-09 ASSESSMENT — ENCOUNTER SYMPTOMS
LOSS OF SENSATION IN FEET: 0
DEPRESSION: 0
OCCASIONAL FEELINGS OF UNSTEADINESS: 0

## 2024-10-09 NOTE — LETTER
October 15, 2024     Reba Carlos DO  [address]  [facility] [address]    Patient: John Deutsch   YOB: 1945   Date of Visit: 10/9/2024       Dear Dr. Reba Carlos DO:    Thank you for referring John Deutsch to me for evaluation. Below are my notes for this consultation.  If you have questions, please do not hesitate to call me. I look forward to following your patient along with you.       Sincerely,     Stephan Mina MD      CC: Debora Isbell MD  ______________________________________________________________________________________            Reason for Consult:  left ear hearing lost     Subjective  History Of Present Illness:  John Deutsch is a 79 y.o. female presents with left-sided hearing loss. This has been progressive. She has intermittent tinnitus left > right. She denies dizziness. She denies loud noise exposure, chemoXRT therapy, or long term use of IV antibiotics. She is interested in hearing rehabilitation.      Past Medical History:  She has a past medical history of Age-related nuclear cataract, bilateral (02/01/2016), Age-related nuclear cataract, unspecified eye (08/01/2016), Combined forms of age-related cataract, right eye (12/09/2022), Crohn's disease, unspecified, without complications (Multi) (08/01/2016), Crohn's disease, unspecified, without complications (Multi) (09/27/2013), Drusen (degenerative) of macula, bilateral (08/01/2016), Dry eye syndrome of unspecified lacrimal gland (08/01/2016), Encounter for general adult medical examination without abnormal findings (07/08/2022), Nonexudative age-related macular degeneration, bilateral, stage unspecified (02/01/2016), Other abnormal and inconclusive findings on diagnostic imaging of breast (09/24/2013), Other conditions influencing health status, Other specified noninflammatory disorders of uterus, Personal history of diseases of the skin and subcutaneous tissue (01/19/2014), Personal history of  other diseases of the respiratory system (2014), Personal history of urinary (tract) infections (2014), Presbyopia (2016), Presence of intraocular lens (2022), Stenosis of anus and rectum, and Unspecified astigmatism, bilateral (2016).    Surgical History:  She has a past surgical history that includes Colonoscopy (10/02/2013); Other surgical history (10/02/2013);  section, classic (10/02/2013); Other surgical history (10/02/2013); and Other surgical history (10/02/2013).     Social History:  She reports that she has never smoked. She has never used smokeless tobacco. She reports that she does not drink alcohol and does not use drugs.    Family History:  family history includes Breast cancer in her sister; Crohn's disease in an other family member; Diabetes in her daughter; Diabetes type II in her son; Obesity in her son; Stroke in her brother.     Medications:  Current Outpatient Medications   Medication Instructions   • acetaminophen (TYLENOL) 1,000 mg, oral, Every 6 hours PRN   • amLODIPine (NORVASC) 10 mg, oral, Daily   • ascorbic acid (Vitamin C) 500 mg tablet 1 tablet, oral, Daily   • cetirizine (ZyrTEC) 10 mg tablet 1 tablet, oral, Daily   • cholecalciferol (VITAMIN D-3) 2,000 Units, oral, Daily   • lidocaine 4 % patch USE AS DIRECTED ON PACKAGE   • pantoprazole (PROTONIX) 40 mg, oral, Daily      Allergies:  Hydrochlorothiazide, Iodinated contrast media, Iodine, Lisinopril, Oxycodone-aspirin, and Oxycodone    Review of Systems:   A comprehensive 10-point review of systems was obtained including constitutional, neurological, HEENT, pulmonary, cardiovascular, genito-urinary, and other pertinent systems and was negative except as noted in the HPI.     Objective  Physical Exam:  Last Recorded Vitals: Blood pressure 127/77, pulse 85, weight 72.2 kg (159 lb 2.8 oz).    On physical exam, the patient is a well-nourished, well-developed patient, in no acute distress, able to  communicate without assistance in English language. Head and face is atraumatic and normocephalic. Salivary glands are intact. Facial strength is symmetrical bilaterally.       On ear examination:  Right ear: The patient has an open and patent ear canal. The tympanic membrane is intact.  AC>BC  Left ear: The patient has an open and patent ear canal. The tympanic membrane is intact.  AC>BC  The Aguilera is midline    On vestibular exam, the patient has no spontaneous nystagmus, no headshake nystagmus, no head-thrust nystagmus, and no nystagmus on hyperventilation or Valsalva maneuvers. [place]-Hallpike maneuver is negative bilaterally.       On neuro exam, the patient is alert and oriented x3, cranial nerves are grossly intact, cerebellar exam is normal.      The rest of the exam, including anterior rhinoscopy, oropharyngeal exam, neck exam, and cardiovascular exam, were normal including no palpable lymphadenopathies, thyroid in the midline position, normal pulses, and normal chest excursion.       Reviewed Results:  Audiology Testing:   I personally reviewed the audiogram from 09/2024 that showed:   Right ear: mild SNHL down sloping to moderate levels . Discrimination: 84%   Left ear: mild SNHL down sloping to severe levels . Discrimination: 76%      Imaging:  None     Procedure:  None    Assessment/Plan    1. Asymmetric SNHL (sensorineural hearing loss)    2. Sensorineural hearing loss (SNHL) of both ears    3. Tinnitus of both ears        In summary, John Deutsch is a 79 y.o. female with bilateral SNHL that is asymmetric, left > right with associated tinnitus.     She would benefit form hearing hearing aids, but we need to rule out retrocochlear pathology. For that I ordered a MRI IAC.    I will see her back in one month to review the results of the MRI.       Scribe Attestation  By signing my name below, I, Yadira Carpenter attest that this documentation has been prepared under the direction and in the presence  of Stephan Mina MD.    ____________________________________________________  Stephan Pittman MD  Professor and Chief   Otology/Neurotology/Lateral Skull-Base Surgery   [facility]

## 2024-10-09 NOTE — PROGRESS NOTES
Reason for Consult:  left ear hearing lost     Subjective   History Of Present Illness:  John Deutsch is a 79 y.o. female presents with left-sided hearing loss. This has been progressive. She has intermittent tinnitus left > right. She denies dizziness. She denies loud noise exposure, chemoXRT therapy, or long term use of IV antibiotics. She is interested in hearing rehabilitation.      Past Medical History:  She has a past medical history of Age-related nuclear cataract, bilateral (2016), Age-related nuclear cataract, unspecified eye (2016), Combined forms of age-related cataract, right eye (2022), Crohn's disease, unspecified, without complications (Multi) (2016), Crohn's disease, unspecified, without complications (Multi) (2013), Drusen (degenerative) of macula, bilateral (2016), Dry eye syndrome of unspecified lacrimal gland (2016), Encounter for general adult medical examination without abnormal findings (2022), Nonexudative age-related macular degeneration, bilateral, stage unspecified (2016), Other abnormal and inconclusive findings on diagnostic imaging of breast (2013), Other conditions influencing health status, Other specified noninflammatory disorders of uterus, Personal history of diseases of the skin and subcutaneous tissue (2014), Personal history of other diseases of the respiratory system (2014), Personal history of urinary (tract) infections (2014), Presbyopia (2016), Presence of intraocular lens (2022), Stenosis of anus and rectum, and Unspecified astigmatism, bilateral (2016).    Surgical History:  She has a past surgical history that includes Colonoscopy (10/02/2013); Other surgical history (10/02/2013);  section, classic (10/02/2013); Other surgical history (10/02/2013); and Other surgical history (10/02/2013).     Social History:  She reports that she has never smoked. She has never  used smokeless tobacco. She reports that she does not drink alcohol and does not use drugs.    Family History:  family history includes Breast cancer in her sister; Crohn's disease in an other family member; Diabetes in her daughter; Diabetes type II in her son; Obesity in her son; Stroke in her brother.     Medications:  Current Outpatient Medications   Medication Instructions    acetaminophen (TYLENOL) 1,000 mg, oral, Every 6 hours PRN    amLODIPine (NORVASC) 10 mg, oral, Daily    ascorbic acid (Vitamin C) 500 mg tablet 1 tablet, oral, Daily    cetirizine (ZyrTEC) 10 mg tablet 1 tablet, oral, Daily    cholecalciferol (VITAMIN D-3) 2,000 Units, oral, Daily    lidocaine 4 % patch USE AS DIRECTED ON PACKAGE    pantoprazole (PROTONIX) 40 mg, oral, Daily      Allergies:  Hydrochlorothiazide, Iodinated contrast media, Iodine, Lisinopril, Oxycodone-aspirin, and Oxycodone    Review of Systems:   A comprehensive 10-point review of systems was obtained including constitutional, neurological, HEENT, pulmonary, cardiovascular, genito-urinary, and other pertinent systems and was negative except as noted in the HPI.     Objective   Physical Exam:  Last Recorded Vitals: Blood pressure 127/77, pulse 85, weight 72.2 kg (159 lb 2.8 oz).    On physical exam, the patient is a well-nourished, well-developed patient, in no acute distress, able to communicate without assistance in English language. Head and face is atraumatic and normocephalic. Salivary glands are intact. Facial strength is symmetrical bilaterally.       On ear examination:  Right ear: The patient has an open and patent ear canal. The tympanic membrane is intact.  AC>BC  Left ear: The patient has an open and patent ear canal. The tympanic membrane is intact.  AC>BC  The Aguil[place] is midline    On vestibular exam, the patient has no spontaneous nystagmus, no headshake nystagmus, no head-thrust nystagmus, and no nystagmus on hyperventilation or Valsalva maneuvers.  [place]-Hallpike maneuver is negative bilaterally.       On neuro exam, the patient is alert and oriented x3, cranial nerves are grossly intact, cerebellar exam is normal.      The rest of the exam, including anterior rhinoscopy, oropharyngeal exam, neck exam, and cardiovascular exam, were normal including no palpable lymphadenopathies, thyroid in the midline position, normal pulses, and normal chest excursion.       Reviewed Results:  Audiology Testing:   I personally reviewed the audiogram from 09/2024 that showed:   Right ear: mild SNHL down sloping to moderate levels . Discrimination: 84%   Left ear: mild SNHL down sloping to severe levels . Discrimination: 76%      Imaging:  None     Procedure:  None    Assessment/Plan     1. Asymmetric SNHL (sensorineural hearing loss)    2. Sensorineural hearing loss (SNHL) of both ears    3. Tinnitus of both ears        In summary, John Deutsch is a 79 y.o. female with bilateral SNHL that is asymmetric, left > right with associated tinnitus.     She would benefit form hearing hearing aids, but we need to rule out retrocochlear pathology. For that I ordered a MRI IAC.    I will see her back in one month to review the results of the MRI.       Scribe Attestation  By signing my name below, I, Yarelis Knight , Yadira attest that this documentation has been prepared under the direction and in the presence of Stephan Mina MD.    ____________________________________________________  Stephan Pittman MD  Professor and Chief   Otology/Neurotology/Lateral Skull-Base Surgery   [facility]

## 2024-10-15 PROBLEM — H90.3 ASYMMETRIC SNHL (SENSORINEURAL HEARING LOSS): Status: ACTIVE | Noted: 2024-10-15

## 2024-10-24 ENCOUNTER — APPOINTMENT (OUTPATIENT)
Dept: RADIOLOGY | Facility: HOSPITAL | Age: 79
End: 2024-10-24
Payer: COMMERCIAL

## 2024-10-24 ENCOUNTER — LAB (OUTPATIENT)
Dept: LAB | Facility: LAB | Age: 79
End: 2024-10-24
Payer: COMMERCIAL

## 2024-10-24 DIAGNOSIS — K50.90 CROHN'S DISEASE WITHOUT COMPLICATION, UNSPECIFIED GASTROINTESTINAL TRACT LOCATION: ICD-10-CM

## 2024-10-24 DIAGNOSIS — D50.0 ANEMIA DUE TO GASTROINTESTINAL BLOOD LOSS: ICD-10-CM

## 2024-10-24 LAB
CRP SERPL-MCNC: 1.27 MG/DL
ERYTHROCYTE [DISTWIDTH] IN BLOOD BY AUTOMATED COUNT: 13.8 % (ref 11.5–14.5)
FERRITIN SERPL-MCNC: 331 NG/ML (ref 8–150)
FOLATE SERPL-MCNC: 17.4 NG/ML
HCT VFR BLD AUTO: 38.5 % (ref 36–46)
HGB BLD-MCNC: 12.9 G/DL (ref 12–16)
IRON SATN MFR SERPL: 15 % (ref 25–45)
IRON SERPL-MCNC: 52 UG/DL (ref 35–150)
MCH RBC QN AUTO: 30.1 PG (ref 26–34)
MCHC RBC AUTO-ENTMCNC: 33.5 G/DL (ref 32–36)
MCV RBC AUTO: 90 FL (ref 80–100)
NRBC BLD-RTO: 0 /100 WBCS (ref 0–0)
PLATELET # BLD AUTO: 316 X10*3/UL (ref 150–450)
RBC # BLD AUTO: 4.29 X10*6/UL (ref 4–5.2)
TIBC SERPL-MCNC: 339 UG/DL (ref 240–445)
UIBC SERPL-MCNC: 287 UG/DL (ref 110–370)
VIT B12 SERPL-MCNC: 474 PG/ML (ref 211–911)
WBC # BLD AUTO: 5.5 X10*3/UL (ref 4.4–11.3)

## 2024-10-24 PROCEDURE — 86140 C-REACTIVE PROTEIN: CPT

## 2024-10-24 PROCEDURE — 82728 ASSAY OF FERRITIN: CPT

## 2024-10-24 PROCEDURE — 82607 VITAMIN B-12: CPT

## 2024-10-24 PROCEDURE — 83540 ASSAY OF IRON: CPT

## 2024-10-24 PROCEDURE — 36415 COLL VENOUS BLD VENIPUNCTURE: CPT

## 2024-10-24 PROCEDURE — 85027 COMPLETE CBC AUTOMATED: CPT

## 2024-10-24 PROCEDURE — 82746 ASSAY OF FOLIC ACID SERUM: CPT

## 2024-10-24 PROCEDURE — 83550 IRON BINDING TEST: CPT

## 2024-10-24 PROCEDURE — 83993 ASSAY FOR CALPROTECTIN FECAL: CPT

## 2024-10-27 LAB — CALPROTECTIN STL-MCNT: 275 UG/G

## 2024-11-02 ENCOUNTER — HOSPITAL ENCOUNTER (OUTPATIENT)
Dept: RADIOLOGY | Facility: HOSPITAL | Age: 79
Discharge: HOME | End: 2024-11-02
Payer: COMMERCIAL

## 2024-11-02 DIAGNOSIS — H93.13 TINNITUS OF BOTH EARS: ICD-10-CM

## 2024-11-02 DIAGNOSIS — H90.3 ASYMMETRIC SNHL (SENSORINEURAL HEARING LOSS): ICD-10-CM

## 2024-11-02 DIAGNOSIS — H90.3 SENSORINEURAL HEARING LOSS (SNHL) OF BOTH EARS: ICD-10-CM

## 2024-11-02 PROCEDURE — 70553 MRI BRAIN STEM W/O & W/DYE: CPT | Performed by: RADIOLOGY

## 2024-11-02 PROCEDURE — 70553 MRI BRAIN STEM W/O & W/DYE: CPT

## 2024-11-02 PROCEDURE — 2550000001 HC RX 255 CONTRASTS: Mod: SE | Performed by: OTOLARYNGOLOGY

## 2024-11-02 PROCEDURE — A9575 INJ GADOTERATE MEGLUMI 0.1ML: HCPCS | Mod: SE | Performed by: OTOLARYNGOLOGY

## 2024-11-02 RX ORDER — GADOTERATE MEGLUMINE 376.9 MG/ML
0.2 INJECTION INTRAVENOUS
Status: COMPLETED | OUTPATIENT
Start: 2024-11-02 | End: 2024-11-02

## 2024-11-02 RX ADMIN — GADOTERATE MEGLUMINE 14 ML: 376.9 INJECTION INTRAVENOUS at 10:39

## 2024-11-06 DIAGNOSIS — K50.90 CROHN'S DISEASE WITHOUT COMPLICATION, UNSPECIFIED GASTROINTESTINAL TRACT LOCATION: Primary | ICD-10-CM

## 2024-11-06 RX ORDER — BUDESONIDE 3 MG/1
CAPSULE, COATED PELLETS ORAL
Qty: 90 CAPSULE | Refills: 11 | Status: SHIPPED | OUTPATIENT
Start: 2024-11-06

## 2024-11-13 ENCOUNTER — OFFICE VISIT (OUTPATIENT)
Dept: OTOLARYNGOLOGY | Facility: HOSPITAL | Age: 79
End: 2024-11-13
Payer: COMMERCIAL

## 2024-11-13 VITALS
TEMPERATURE: 97.6 F | RESPIRATION RATE: 18 BRPM | SYSTOLIC BLOOD PRESSURE: 147 MMHG | HEART RATE: 80 BPM | DIASTOLIC BLOOD PRESSURE: 78 MMHG

## 2024-11-13 DIAGNOSIS — H93.13 TINNITUS OF BOTH EARS: ICD-10-CM

## 2024-11-13 DIAGNOSIS — H90.3 ASYMMETRIC SNHL (SENSORINEURAL HEARING LOSS): Primary | ICD-10-CM

## 2024-11-13 DIAGNOSIS — H90.3 SENSORINEURAL HEARING LOSS (SNHL) OF BOTH EARS: ICD-10-CM

## 2024-11-13 PROCEDURE — 3078F DIAST BP <80 MM HG: CPT | Performed by: OTOLARYNGOLOGY

## 2024-11-13 PROCEDURE — 3077F SYST BP >= 140 MM HG: CPT | Performed by: OTOLARYNGOLOGY

## 2024-11-13 PROCEDURE — 1160F RVW MEDS BY RX/DR IN RCRD: CPT | Performed by: OTOLARYNGOLOGY

## 2024-11-13 PROCEDURE — 99417 PROLNG OP E/M EACH 15 MIN: CPT | Performed by: OTOLARYNGOLOGY

## 2024-11-13 PROCEDURE — 1159F MED LIST DOCD IN RCRD: CPT | Performed by: OTOLARYNGOLOGY

## 2024-11-13 PROCEDURE — 99213 OFFICE O/P EST LOW 20 MIN: CPT | Performed by: OTOLARYNGOLOGY

## 2024-11-13 NOTE — PROGRESS NOTES
Reason for Consult:  bilateral sensorineural hearing loss      Subjective   History Of Present Illness:  John Deutsch is a 79 y.o. female with left-sided hearing loss. This has been progressive. She has intermittent tinnitus left > right. She denies dizziness. She denies loud noise exposure, chemoXRT therapy, or long term use of IV antibiotics. She is interested in hearing rehabilitation.    Follow-up after MRI IAC to rule out retrocochlear pathology.  Subjectively today, she notes no changes to her hearing or other new otologic symptoms.      Past Medical History:  She has a past medical history of Age-related nuclear cataract, bilateral (2016), Age-related nuclear cataract, unspecified eye (2016), Combined forms of age-related cataract, right eye (2022), Crohn's disease, unspecified, without complications (Multi) (2016), Crohn's disease, unspecified, without complications (Multi) (2013), Drusen (degenerative) of macula, bilateral (2016), Dry eye syndrome of unspecified lacrimal gland (2016), Encounter for general adult medical examination without abnormal findings (2022), Nonexudative age-related macular degeneration, bilateral, stage unspecified (2016), Other abnormal and inconclusive findings on diagnostic imaging of breast (2013), Other conditions influencing health status, Other specified noninflammatory disorders of uterus, Personal history of diseases of the skin and subcutaneous tissue (2014), Personal history of other diseases of the respiratory system (2014), Personal history of urinary (tract) infections (2014), Presbyopia (2016), Presence of intraocular lens (2022), Stenosis of anus and rectum, and Unspecified astigmatism, bilateral (2016).    Surgical History:  She has a past surgical history that includes Colonoscopy (10/02/2013); Other surgical history (10/02/2013);  section, classic  (10/02/2013); Other surgical history (10/02/2013); and Other surgical history (10/02/2013).     Social History:  She reports that she has never smoked. She has never used smokeless tobacco. She reports that she does not drink alcohol and does not use drugs.    Family History:  family history includes Breast cancer in her sister; Crohn's disease in an other family member; Diabetes in her daughter; Diabetes type II in her son; Obesity in her son; Stroke in her brother.     Medications:  Current Outpatient Medications   Medication Instructions    acetaminophen (TYLENOL) 1,000 mg, oral, Every 6 hours PRN    amLODIPine (NORVASC) 10 mg, oral, Daily    ascorbic acid (Vitamin C) 500 mg tablet 1 tablet, oral, Daily    budesonide EC (Entocort EC) 3 mg 24 hr capsule Take 3 capsules once daily for one month then taper by 1 capsule every month until off budesonide EC    cetirizine (ZyrTEC) 10 mg tablet 1 tablet, oral, Daily    cholecalciferol (VITAMIN D-3) 2,000 Units, oral, Daily    lidocaine 4 % patch USE AS DIRECTED ON PACKAGE    pantoprazole (PROTONIX) 40 mg, oral, Daily      Allergies:  Hydrochlorothiazide, Iodinated contrast media, Iodine, Lisinopril, Oxycodone-aspirin, and Oxycodone    Review of Systems:   A comprehensive 10-point review of systems was obtained including constitutional, neurological, HEENT, pulmonary, cardiovascular, genito-urinary, and other pertinent systems and was negative except as noted in the HPI.     Objective   Physical Exam:  Last Recorded Vitals: Blood pressure 147/78, pulse 80, temperature 36.4 °C (97.6 °F), resp. rate 18.    On physical exam, the patient is a well-nourished, well-developed patient, in no acute distress, able to communicate without assistance in English language. Head and face is atraumatic and normocephalic. Salivary glands are intact. Facial strength is symmetrical bilaterally.        On ear examination:  Right ear: The patient has an open and patent ear canal. The tympanic  membrane is intact. AC>BC  Left ear: The patient has an open and patent ear canal. The tympanic membrane is intact. AC>BC    Dottie midline.     On vestibular exam, the patient has no spontaneous nystagmus, no headshake nystagmus, no head-thrust nystagmus, and no nystagmus on hyperventilation or Valsalva maneuvers. Pauline-Hallpike maneuver is negative bilaterally.        On neuro exam, the patient is alert and oriented x3, cranial nerves are grossly intact, cerebellar exam is normal.       The rest of the exam, including anterior rhinoscopy, oropharyngeal exam, neck exam, and cardiovascular exam, were normal including no palpable lymphadenopathies, thyroid in the midline position, normal pulses, and normal chest excursion.    Reviewed Results:  Audiology Testing:   I personally reviewed the audiogram from 09/2024 that showed:            Right ear: mild SNHL down sloping to moderate levels . Discrimination: 84%            Left ear: mild SNHL down sloping to severe levels . Discrimination: 76%      Imaging:  I personally reviewed the MRI of the temporal bone from 11/2/2024 that showed: No retrocochlear or CPA pathology that would suggest asymmetric hearing loss.       Assessment/Plan     1. Asymmetric SNHL (sensorineural hearing loss)    2. Sensorineural hearing loss (SNHL) of both ears    3. Tinnitus of both ears        In summary, John Deutsch is a 79 y.o. female with asymmetric sensorineural hearing loss left worse than right associated with some tinnitus.  Seen as a follow-up today after MRI which shows no CPA angle pathology.  Discussed results with patient and medical clearance form for hearing aids granted.      - Follow up with audiology for hearimg amplification    ____________________________________________________  Stephan Pittman MD  Professor and Chief   Otology/Neurotology/Lateral Skull-Base Surgery   [facility]

## 2024-11-13 NOTE — LETTER
November 14, 2024     Reba Carlos DO  [id number]  [facility] [address]    Patient: John Deutsch   YOB: 1945   Date of Visit: 11/13/2024       Dear Dr. Reba Carlos DO:    Thank you for referring John Deutsch to me for evaluation. Below are my notes for this consultation.  If you have questions, please do not hesitate to call me. I look forward to following your patient along with you.       Sincerely,     Stephan Mina MD      CC: Debora Isbell MD  ______________________________________________________________________________________            Reason for Consult:  bilateral sensorineural hearing loss      Subjective  History Of Present Illness:  John Deutsch is a 79 y.o. female with left-sided hearing loss. This has been progressive. She has intermittent tinnitus left > right. She denies dizziness. She denies loud noise exposure, chemoXRT therapy, or long term use of IV antibiotics. She is interested in hearing rehabilitation.    Follow-up after MRI IAC to rule out retrocochlear pathology.  Subjectively today, she notes no changes to her hearing or other new otologic symptoms.      Past Medical History:  She has a past medical history of Age-related nuclear cataract, bilateral (02/01/2016), Age-related nuclear cataract, unspecified eye (08/01/2016), Combined forms of age-related cataract, right eye (12/09/2022), Crohn's disease, unspecified, without complications (Multi) (08/01/2016), Crohn's disease, unspecified, without complications (Multi) (09/27/2013), Drusen (degenerative) of macula, bilateral (08/01/2016), Dry eye syndrome of unspecified lacrimal gland (08/01/2016), Encounter for general adult medical examination without abnormal findings (07/08/2022), Nonexudative age-related macular degeneration, bilateral, stage unspecified (02/01/2016), Other abnormal and inconclusive findings on diagnostic imaging of breast (09/24/2013), Other conditions influencing health  status, Other specified noninflammatory disorders of uterus, Personal history of diseases of the skin and subcutaneous tissue (2014), Personal history of other diseases of the respiratory system (2014), Personal history of urinary (tract) infections (2014), Presbyopia (2016), Presence of intraocular lens (2022), Stenosis of anus and rectum, and Unspecified astigmatism, bilateral (2016).    Surgical History:  She has a past surgical history that includes Colonoscopy (10/02/2013); Other surgical history (10/02/2013);  section, classic (10/02/2013); Other surgical history (10/02/2013); and Other surgical history (10/02/2013).     Social History:  She reports that she has never smoked. She has never used smokeless tobacco. She reports that she does not drink alcohol and does not use drugs.    Family History:  family history includes Breast cancer in her sister; Crohn's disease in an other family member; Diabetes in her daughter; Diabetes type II in her son; Obesity in her son; Stroke in her brother.     Medications:  Current Outpatient Medications   Medication Instructions   • acetaminophen (TYLENOL) 1,000 mg, oral, Every 6 hours PRN   • amLODIPine (NORVASC) 10 mg, oral, Daily   • ascorbic acid (Vitamin C) 500 mg tablet 1 tablet, oral, Daily   • budesonide EC (Entocort EC) 3 mg 24 hr capsule Take 3 capsules once daily for one month then taper by 1 capsule every month until off budesonide EC   • cetirizine (ZyrTEC) 10 mg tablet 1 tablet, oral, Daily   • cholecalciferol (VITAMIN D-3) 2,000 Units, oral, Daily   • lidocaine 4 % patch USE AS DIRECTED ON PACKAGE   • pantoprazole (PROTONIX) 40 mg, oral, Daily      Allergies:  Hydrochlorothiazide, Iodinated contrast media, Iodine, Lisinopril, Oxycodone-aspirin, and Oxycodone    Review of Systems:   A comprehensive 10-point review of systems was obtained including constitutional, neurological, HEENT, pulmonary, cardiovascular,  genito-urinary, and other pertinent systems and was negative except as noted in the HPI.     Objective  Physical Exam:  Last Recorded Vitals: Blood pressure 147/78, pulse 80, temperature 36.4 °C (97.6 °F), resp. rate 18.    On physical exam, the patient is a well-nourished, well-developed patient, in no acute distress, able to communicate without assistance in English language. Head and face is atraumatic and normocephalic. Salivary glands are intact. Facial strength is symmetrical bilaterally.        On ear examination:  Right ear: The patient has an open and patent ear canal. The tympanic membrane is intact. AC>BC  Left ear: The patient has an open and patent ear canal. The tympanic membrane is intact. AC>BC    Dottie midline.     On vestibular exam, the patient has no spontaneous nystagmus, no headshake nystagmus, no head-thrust nystagmus, and no nystagmus on hyperventilation or Valsalva maneuvers. [place]-Hallpike maneuver is negative bilaterally.        On neuro exam, the patient is alert and oriented x3, cranial nerves are grossly intact, cerebellar exam is normal.       The rest of the exam, including anterior rhinoscopy, oropharyngeal exam, neck exam, and cardiovascular exam, were normal including no palpable lymphadenopathies, thyroid in the midline position, normal pulses, and normal chest excursion.    Reviewed Results:  Audiology Testing:   I personally reviewed the audiogram from 09/2024 that showed:            Right ear: mild SNHL down sloping to moderate levels . Discrimination: 84%            Left ear: mild SNHL down sloping to severe levels . Discrimination: 76%      Imaging:  I personally reviewed the MRI of the temporal bone from 11/2/2024 that showed: No retrocochlear or CPA pathology that would suggest asymmetric hearing loss.       Assessment/Plan    1. Asymmetric SNHL (sensorineural hearing loss)    2. Sensorineural hearing loss (SNHL) of both ears    3. Tinnitus of both ears        In summary,  John Deutsch is a 79 y.o. female with asymmetric sensorineural hearing loss left worse than right associated with some tinnitus.  Seen as a follow-up today after MRI which shows no CPA angle pathology.  Discussed results with patient and medical clearance form for hearing aids granted.      - Follow up with audiology for hearimg amplification    ____________________________________________________  Stephan Pittman MD  Professor and Chief   Otology/Neurotology/Lateral Skull-Base Surgery   [facility]

## 2024-11-18 ENCOUNTER — TELEPHONE (OUTPATIENT)
Dept: AUDIOLOGY | Facility: HOSPITAL | Age: 79
End: 2024-11-18
Payer: COMMERCIAL

## 2024-11-18 NOTE — TELEPHONE ENCOUNTER
Phone call placed to Ms. Deutsch on 11/8/2024 at 09:56 AM to inform her that I completed insurance verification for hearing aid benefits.    Audiology Dept is NOT in network with her insurance for hearing aid services. Her appointment for a hearing aid consultation on 12/16/2024 will be cancelled.     She does have hearing aid benefits through her [employer] plan - in-network Audiology practices include [facility], [facility], and [facility]. She was encouraged to call any of these practices to schedule a hearing aid consultation.     Please see scanned documents for hearing aid insurance verification form for further details.       Charlotte Vallecillo, CCC-A   Licensed Clinical Audiologist

## 2024-12-06 ENCOUNTER — APPOINTMENT (OUTPATIENT)
Dept: OPHTHALMOLOGY | Facility: CLINIC | Age: 79
End: 2024-12-06
Payer: COMMERCIAL

## 2024-12-16 ENCOUNTER — APPOINTMENT (OUTPATIENT)
Dept: AUDIOLOGY | Facility: HOSPITAL | Age: 79
End: 2024-12-16
Payer: COMMERCIAL

## 2025-01-07 ENCOUNTER — APPOINTMENT (OUTPATIENT)
Dept: OPHTHALMOLOGY | Facility: CLINIC | Age: 80
End: 2025-01-07
Payer: COMMERCIAL

## 2025-01-07 DIAGNOSIS — H04.129 DRY EYE: ICD-10-CM

## 2025-01-07 DIAGNOSIS — H35.363 DEGENERATIVE DRUSEN OF BOTH EYES: ICD-10-CM

## 2025-01-07 DIAGNOSIS — Z96.1 PSEUDOPHAKIA, LEFT EYE: ICD-10-CM

## 2025-01-07 DIAGNOSIS — H25.811 COMBINED FORMS OF AGE-RELATED CATARACT OF RIGHT EYE: Primary | ICD-10-CM

## 2025-01-07 PROCEDURE — 92134 CPTRZ OPH DX IMG PST SGM RTA: CPT | Performed by: OPHTHALMOLOGY

## 2025-01-07 PROCEDURE — 92014 COMPRE OPH EXAM EST PT 1/>: CPT | Performed by: OPHTHALMOLOGY

## 2025-01-07 ASSESSMENT — TONOMETRY
OD_IOP_MMHG: 14
IOP_METHOD: GOLDMANN APPLANATION
OS_IOP_MMHG: 14

## 2025-01-07 ASSESSMENT — REFRACTION_WEARINGRX
OS_SPHERE: +0.50
OD_ADD: +2.75
OD_AXIS: 075
OS_ADD: +2.75
OS_CYLINDER: -0.75
OD_CYLINDER: -2.25
OD_SPHERE: +1.75
OS_AXIS: 075

## 2025-01-07 ASSESSMENT — REFRACTION_MANIFEST
OD_AXIS: 075
OS_ADD: +2.72
OD_ADD: +2.75
OD_SPHERE: +1.75
OS_AXIS: 075
OD_CYLINDER: -2.25
OS_CYLINDER: -0.75
OS_SPHERE: +0.50

## 2025-01-07 ASSESSMENT — CUP TO DISC RATIO
OD_RATIO: .4
OS_RATIO: .4

## 2025-01-07 ASSESSMENT — CONF VISUAL FIELD
OS_NORMAL: 1
OS_INFERIOR_TEMPORAL_RESTRICTION: 0
OD_SUPERIOR_TEMPORAL_RESTRICTION: 0
OD_INFERIOR_NASAL_RESTRICTION: 0
OD_NORMAL: 1
OS_INFERIOR_NASAL_RESTRICTION: 0
OD_INFERIOR_TEMPORAL_RESTRICTION: 0
OS_SUPERIOR_TEMPORAL_RESTRICTION: 0
OD_SUPERIOR_NASAL_RESTRICTION: 0
OS_SUPERIOR_NASAL_RESTRICTION: 0

## 2025-01-07 ASSESSMENT — VISUAL ACUITY
OD_CC: 20/25-2
OS_CC: 20/20-2
METHOD: SNELLEN - LINEAR
CORRECTION_TYPE: GLASSES

## 2025-01-07 ASSESSMENT — PACHYMETRY
EXAM_DATE: 1/7/2025
OD_CT(UM): 538
OS_CT(UM): 543

## 2025-01-07 NOTE — PROGRESS NOTES
Assessment/Plan   Diagnoses and all orders for this visit:  Combined forms of age-related cataract of right eye  -  pt not bothered by anisometropia and BCVA still good  observe  Degenerative drusen of both eyes  -     OCT, Retina - OU - Both Eyes-stable  Pseudophakia, left eye  Dry eye  recommend use of artificial tears 4 times a day, may use gel or ointment at night if symptoms are present in the morning.   -Followup in 1 year or as needed for symptoms (RSVP: redness, sensitivity to light, vision loss, pain)

## 2025-01-30 ENCOUNTER — LAB REQUISITION (OUTPATIENT)
Dept: LAB | Facility: CLINIC | Age: 80
End: 2025-01-30
Payer: COMMERCIAL

## 2025-01-30 DIAGNOSIS — I50.9 HEART FAILURE, UNSPECIFIED (H): ICD-10-CM

## 2025-01-31 LAB
ALBUMIN SERPL BCG-MCNC: 2.6 G/DL (ref 3.5–5.2)
ALP SERPL-CCNC: 504 U/L (ref 40–150)
ALT SERPL W P-5'-P-CCNC: 45 U/L (ref 0–50)
ANION GAP SERPL CALCULATED.3IONS-SCNC: 10 MMOL/L (ref 7–15)
AST SERPL W P-5'-P-CCNC: 76 U/L (ref 0–45)
BILIRUB SERPL-MCNC: 0.9 MG/DL
BUN SERPL-MCNC: 10.6 MG/DL (ref 8–23)
CALCIUM SERPL-MCNC: 9.4 MG/DL (ref 8.8–10.4)
CHLORIDE SERPL-SCNC: 102 MMOL/L (ref 98–107)
CREAT SERPL-MCNC: 1.42 MG/DL (ref 0.51–0.95)
EGFRCR SERPLBLD CKD-EPI 2021: 38 ML/MIN/1.73M2
ERYTHROCYTE [DISTWIDTH] IN BLOOD BY AUTOMATED COUNT: 27.6 % (ref 10–15)
GLUCOSE SERPL-MCNC: 216 MG/DL (ref 70–99)
HCO3 SERPL-SCNC: 26 MMOL/L (ref 22–29)
HCT VFR BLD AUTO: 30.3 % (ref 35–47)
HGB BLD-MCNC: 9.2 G/DL (ref 11.7–15.7)
MCH RBC QN AUTO: 26.6 PG (ref 26.5–33)
MCHC RBC AUTO-ENTMCNC: 30.4 G/DL (ref 31.5–36.5)
MCV RBC AUTO: 88 FL (ref 78–100)
PLATELET # BLD AUTO: 280 10E3/UL (ref 150–450)
POTASSIUM SERPL-SCNC: 4.3 MMOL/L (ref 3.4–5.3)
PROT SERPL-MCNC: 7.1 G/DL (ref 6.4–8.3)
RBC # BLD AUTO: 3.46 10E6/UL (ref 3.8–5.2)
SODIUM SERPL-SCNC: 138 MMOL/L (ref 135–145)
WBC # BLD AUTO: 4.1 10E3/UL (ref 4–11)

## 2025-01-31 PROCEDURE — 36415 COLL VENOUS BLD VENIPUNCTURE: CPT | Mod: ORL | Performed by: FAMILY MEDICINE

## 2025-01-31 PROCEDURE — P9604 ONE-WAY ALLOW PRORATED TRIP: HCPCS | Mod: ORL | Performed by: FAMILY MEDICINE

## 2025-01-31 PROCEDURE — 85027 COMPLETE CBC AUTOMATED: CPT | Mod: ORL | Performed by: FAMILY MEDICINE

## 2025-01-31 PROCEDURE — 36415 COLL VENOUS BLD VENIPUNCTURE: CPT | Performed by: FAMILY MEDICINE

## 2025-01-31 PROCEDURE — 85041 AUTOMATED RBC COUNT: CPT | Performed by: FAMILY MEDICINE

## 2025-01-31 PROCEDURE — 80053 COMPREHEN METABOLIC PANEL: CPT | Mod: ORL | Performed by: FAMILY MEDICINE

## 2025-01-31 PROCEDURE — 82565 ASSAY OF CREATININE: CPT | Performed by: FAMILY MEDICINE

## 2025-02-03 ENCOUNTER — HOSPITAL ENCOUNTER (OUTPATIENT)
Dept: RADIOLOGY | Facility: HOSPITAL | Age: 80
Discharge: HOME | End: 2025-02-03
Payer: COMMERCIAL

## 2025-02-03 DIAGNOSIS — Z78.0 ENCOUNTER FOR OSTEOPOROSIS SCREENING IN ASYMPTOMATIC POSTMENOPAUSAL PATIENT: ICD-10-CM

## 2025-02-03 DIAGNOSIS — Z00.00 HEALTH CARE MAINTENANCE: ICD-10-CM

## 2025-02-03 DIAGNOSIS — Z13.820 ENCOUNTER FOR OSTEOPOROSIS SCREENING IN ASYMPTOMATIC POSTMENOPAUSAL PATIENT: ICD-10-CM

## 2025-02-03 PROCEDURE — 77080 DXA BONE DENSITY AXIAL: CPT

## 2025-02-06 ENCOUNTER — LAB REQUISITION (OUTPATIENT)
Dept: LAB | Facility: CLINIC | Age: 80
End: 2025-02-06
Payer: COMMERCIAL

## 2025-02-06 DIAGNOSIS — I50.9 HEART FAILURE, UNSPECIFIED (H): ICD-10-CM

## 2025-02-07 LAB
ALBUMIN SERPL BCG-MCNC: 3 G/DL (ref 3.5–5.2)
ALP SERPL-CCNC: 434 U/L (ref 40–150)
ALT SERPL W P-5'-P-CCNC: 73 U/L (ref 0–50)
ANION GAP SERPL CALCULATED.3IONS-SCNC: 11 MMOL/L (ref 7–15)
AST SERPL W P-5'-P-CCNC: 120 U/L (ref 0–45)
BILIRUB SERPL-MCNC: 0.8 MG/DL
BUN SERPL-MCNC: 6.9 MG/DL (ref 8–23)
CALCIUM SERPL-MCNC: 9.3 MG/DL (ref 8.8–10.4)
CHLORIDE SERPL-SCNC: 107 MMOL/L (ref 98–107)
CREAT SERPL-MCNC: 0.91 MG/DL (ref 0.51–0.95)
EGFRCR SERPLBLD CKD-EPI 2021: 65 ML/MIN/1.73M2
GLUCOSE SERPL-MCNC: 180 MG/DL (ref 70–99)
HCO3 SERPL-SCNC: 22 MMOL/L (ref 22–29)
POTASSIUM SERPL-SCNC: 3.9 MMOL/L (ref 3.4–5.3)
PROT SERPL-MCNC: 7.5 G/DL (ref 6.4–8.3)
SODIUM SERPL-SCNC: 140 MMOL/L (ref 135–145)

## 2025-02-07 PROCEDURE — P9604 ONE-WAY ALLOW PRORATED TRIP: HCPCS | Mod: ORL | Performed by: FAMILY MEDICINE

## 2025-02-07 PROCEDURE — 36415 COLL VENOUS BLD VENIPUNCTURE: CPT | Mod: ORL | Performed by: FAMILY MEDICINE

## 2025-02-07 PROCEDURE — 80053 COMPREHEN METABOLIC PANEL: CPT | Mod: ORL | Performed by: FAMILY MEDICINE

## 2025-05-19 NOTE — PROGRESS NOTES
REASON FOR VISIT:  Crohn's disease    HPI:  John Deutsch is a 80 y.o. female who presents for follow-up.  Last seen 9/20224.  Crohn's disease s/p ileocolonic resection 1999. Postoperatively she was on Asacol for a long time, however, this was stopped 2010.     (+) significant perianal Crohn's disease with anal stenosis dating back into the 1990s.  Multiple EUAs with anal dilation  (+) chronic constipation.   (+) managed with intermittent dilation of IC anastomosis and also of anal stenosis.      8/2022 EGD with 3 cm hiatal hernia and atrophic gastritis. Biopsies showed chronic gastritis without H. pylori. Colonoscopy showed large perianal skin tags. There was a moderate anal stricture that was fibrotic and dilated with the index finger. There was mild inflammation with a few superficial ulcers and erythema in the neoterminal ileum just at and past the ileocolonic anastomosis;the remainder of the terminal ileum was normal. The ileocolonic anastomosis was healthy-appearing without stenosis. The colon was normal. No biopsies were taken.     6/2023 treated for GERD and SIBO with increase of pantoprazole from every day to BID and course of ciprofloxacin.  The ciprofloxacin caused diarrhea and her bottom felt irritated, so she stopped the Cipro.  Took pantoprazole BID for a while, then back on once daily.  Not sure helped.     Last seen 9/2024 and c/o bloating.  Stools depended on what ate.  Varied from 0-4 times daily.  Stool formed.  Some constipation, but not as much as before.  C/O some diarrhea.  Not able to eat all the foods she used to tolerate.  Using a lot of cait chews for pain in stomach, Pepto-Bismol for diarrhea-works after a couple of doses, and flour with water for diarrhea.   Was under a lot of stress as her randdaughter had brain surgery and the patient needed to move (house condemned) and no place to move to on her limited budget.  FCP was 275 and given a course of budesonide.    In follow-up today,  "she did budesonide, but thinks it \"made her sick\".  She thinks it make her \"bottom break out\" and become sore, but once she stopped it, she felt better.  She has not been feeling well.  Abdomen is swollen and distended.  She has abdominal discomfort.  Moving bowels some days 3-4 and stools loose , some days watery.  Feels like anything she eats makes her move her bowels.  No vomiting.  Nauseated.  Has run outr of medications    She did move in March 2025. Living situation OK, but not great.  Lives in [facility].  Daughter is ill with breast cancer.     REVIEW OF SYSTEMS  Complete review of systems otherwise negative per complaint    Allergies[1]    Medical History[2]    Surgical History[3]    Current Medications[4]    PHYSICAL EXAM:  BP (!) 167/95   Pulse 67   Temp 36.3 °C (97.3 °F)   Wt 69.9 kg (154 lb 3.2 oz)   SpO2 95% Comment: ERWIN  BMI 31.14 kg/m²   Vital signs reviewed  Patient alert and oriented in no acute distress  Anicteric  No cervical adenopathy  Cardiac exam regular rate and rhythm S1-S2 without murmurs gallops or rubs  Lungs clear to auscultation bilaterally  Abdomen soft and nontender without organomegaly or mass.  No rebound or guarding.  Bowel sounds present  Extremities without edema    Lab Results   Component Value Date    WBC 5.5 10/24/2024    HGB 12.9 10/24/2024    HCT 38.5 10/24/2024    MCV 90 10/24/2024     10/24/2024     Lab Results   Component Value Date    ALT 8 02/06/2023    AST 16 02/06/2023    ALKPHOS 94 02/06/2023    BILITOT 0.5 02/06/2023       ASSESSMENT  #Crohn's disease-difficult to know if symptoms are related to progressive Crohn's disease at the ileocolonic anastomosis, or, to something else, possibly small intestinal bacterial overgrowth.  We will give her a 2-week trial of ciprofloxacin 1 tablet twice daily to see if this can improve her bowel habits.  Will get her set up for colonoscopy which we have wanted to do when she was seen back in the fall, but was " never completed.  In the past, when the sorts of symptoms have occurred, she has improved after colonoscopy with anastomotic balloon dilation.  Will check labs.    # Nausea-we will refill her pantoprazole.  She is under a lot of stress and I suspect that some of her symptoms are related to this.    PLAN  1) continue pantoprazole 1 tablet twice daily  2) begin ciprofloxacin 1 tablet twice daily for 2 weeks for possible small intestinal bacterial overgrowth as a contributing factor to your diarrhea.  3) if symptoms do not improve within 2 weeks after completing the 2 weeks of ciprofloxacin, please contact the office  4) schedule colonoscopy   5) check labs; you can hold off on doing this until you see your primary care physician and then do all the blood test at the same time  6) follow-up in the office in 6 months         [1]   Allergies  Allergen Reactions    Hydrochlorothiazide Unknown    Iodinated Contrast Media Dizziness and Syncope     PASSED OUT    Iodine Unknown    Lisinopril Angioedema    Oxycodone-Aspirin Unknown    Oxycodone Palpitations, Other and Dizziness     CONFUSION  TACHYCARDIA   [2]   Past Medical History:  Diagnosis Date    Age-related nuclear cataract, bilateral 02/01/2016    Age-related nuclear cataract of both eyes    Age-related nuclear cataract, unspecified eye 08/01/2016    Nuclear sclerosis    Combined forms of age-related cataract, right eye 12/09/2022    Combined form of age-related cataract, right eye    Crohn's disease, unspecified, without complications (Multi) 08/01/2016    Crohn disease    Crohn's disease, unspecified, without complications (Multi) 09/27/2013    Crohn's disease    Drusen (degenerative) of macula, bilateral 08/01/2016    Drusen (degenerative) of macula, bilateral    Dry eye syndrome of unspecified lacrimal gland 08/01/2016    Dry eye syndrome    Encounter for general adult medical examination without abnormal findings 07/08/2022    Medicare annual wellness visit,  subsequent    Nonexudative age-related macular degeneration, bilateral, stage unspecified 2016    Age-related macular degeneration, dry, both eyes    Other abnormal and inconclusive findings on diagnostic imaging of breast 2013    Mammogram abnormal    Other conditions influencing health status     Congenital Stenosis Of Large Intestine    Other specified noninflammatory disorders of uterus     Fibrosis of uterus    Personal history of diseases of the skin and subcutaneous tissue 2014    History of urticaria    Personal history of other diseases of the respiratory system 2014    History of acute bronchitis    Personal history of urinary (tract) infections 2014    History of urinary tract infection    Presbyopia 2016    Presbyopia OU    Presence of intraocular lens 2022    Pseudophakia of left eye    Stenosis of anus and rectum     Rectal stricture    Unspecified astigmatism, bilateral 2016    Astigmatism, bilateral   [3]   Past Surgical History:  Procedure Laterality Date     SECTION, CLASSIC  10/02/2013     Section    COLONOSCOPY  10/02/2013    Complete Colonoscopy    OTHER SURGICAL HISTORY  10/02/2013    Colonoscopy (Fiberoptic) Forceps Biopsy    OTHER SURGICAL HISTORY  10/02/2013    Proctosigmoidoscopy (Rigid - Diagnostic)    OTHER SURGICAL HISTORY  10/02/2013    Ileoanal Pouch Fistula Repair Transperin & Transabd Approach   [4]   Current Outpatient Medications   Medication Sig Dispense Refill    acetaminophen (Tylenol) 500 mg tablet Take 2 tablets (1,000 mg) by mouth every 6 hours if needed for mild pain (1 - 3).      amLODIPine (Norvasc) 10 mg tablet Take 1 tablet (10 mg) by mouth once daily. 90 tablet 3    ascorbic acid (Vitamin C) 500 mg tablet Take 1 tablet (500 mg) by mouth once daily.      budesonide EC (Entocort EC) 3 mg 24 hr capsule Take 3 capsules once daily for one month then taper by 1 capsule every month until off budesonide EC 90  capsule 11    cetirizine (ZyrTEC) 10 mg tablet Take 1 tablet (10 mg) by mouth once daily.      cholecalciferol (Vitamin D-3) 25 MCG (1000 UT) tablet Take 2 tablets (2,000 Units) by mouth once daily. 30 tablet 0    lidocaine 4 % patch USE AS DIRECTED ON PACKAGE      pantoprazole (ProtoNix) 40 mg EC tablet Take 1 tablet (40 mg) by mouth once daily. 90 tablet 3     No current facility-administered medications for this visit.

## 2025-05-20 ENCOUNTER — OFFICE VISIT (OUTPATIENT)
Dept: GASTROENTEROLOGY | Facility: HOSPITAL | Age: 80
End: 2025-05-20
Payer: COMMERCIAL

## 2025-05-20 VITALS
HEART RATE: 67 BPM | TEMPERATURE: 97.3 F | SYSTOLIC BLOOD PRESSURE: 167 MMHG | BODY MASS INDEX: 31.14 KG/M2 | OXYGEN SATURATION: 95 % | DIASTOLIC BLOOD PRESSURE: 95 MMHG | WEIGHT: 154.2 LBS

## 2025-05-20 DIAGNOSIS — K50.90 CROHN'S DISEASE WITHOUT COMPLICATION, UNSPECIFIED GASTROINTESTINAL TRACT LOCATION: Primary | ICD-10-CM

## 2025-05-20 DIAGNOSIS — I50.30 DIASTOLIC CONGESTIVE HEART FAILURE, UNSPECIFIED HF CHRONICITY: ICD-10-CM

## 2025-05-20 DIAGNOSIS — K63.8219 SMALL INTESTINAL BACTERIAL OVERGROWTH: ICD-10-CM

## 2025-05-20 DIAGNOSIS — K21.9 GASTROESOPHAGEAL REFLUX DISEASE WITHOUT ESOPHAGITIS: ICD-10-CM

## 2025-05-20 PROCEDURE — 1126F AMNT PAIN NOTED NONE PRSNT: CPT | Performed by: INTERNAL MEDICINE

## 2025-05-20 PROCEDURE — 1036F TOBACCO NON-USER: CPT | Performed by: INTERNAL MEDICINE

## 2025-05-20 PROCEDURE — 99214 OFFICE O/P EST MOD 30 MIN: CPT | Performed by: INTERNAL MEDICINE

## 2025-05-20 PROCEDURE — 3080F DIAST BP >= 90 MM HG: CPT | Performed by: INTERNAL MEDICINE

## 2025-05-20 PROCEDURE — 3077F SYST BP >= 140 MM HG: CPT | Performed by: INTERNAL MEDICINE

## 2025-05-20 PROCEDURE — 1159F MED LIST DOCD IN RCRD: CPT | Performed by: INTERNAL MEDICINE

## 2025-05-20 RX ORDER — CIPROFLOXACIN 500 MG/1
500 TABLET, FILM COATED ORAL 2 TIMES DAILY
Qty: 28 TABLET | Refills: 0 | Status: SHIPPED | OUTPATIENT
Start: 2025-05-20 | End: 2025-06-03

## 2025-05-20 RX ORDER — PANTOPRAZOLE SODIUM 40 MG/1
40 TABLET, DELAYED RELEASE ORAL DAILY
Qty: 90 TABLET | Refills: 3 | Status: SHIPPED | OUTPATIENT
Start: 2025-05-20 | End: 2026-05-20

## 2025-05-20 ASSESSMENT — ENCOUNTER SYMPTOMS
LOSS OF SENSATION IN FEET: 0
DEPRESSION: 0
OCCASIONAL FEELINGS OF UNSTEADINESS: 0

## 2025-05-20 ASSESSMENT — PAIN SCALES - GENERAL: PAINLEVEL_OUTOF10: 0-NO PAIN

## 2025-05-20 NOTE — PATIENT INSTRUCTIONS
Thank you for coming in for follow-up today.  As we discussed, I think your symptoms are related to 1 of 2 things.  Either, you have had some narrowing at the location where the large and small bowel has been sewn together that needs to be stretched open again as we have done in the past.  Or, you have small intestinal bacterial overgrowth.  This can be treated with antibiotics.    We will go ahead and get you treated with antibiotics (ciprofloxacin) for 2 weeks.  And arrange for you to have a colonoscopy.  As we reviewed today:    PLAN  1) continue pantoprazole 1 tablet twice daily  2) begin ciprofloxacin 1 tablet twice daily for 2 weeks for possible small intestinal bacterial overgrowth as a contributing factor to your diarrhea.  3) if symptoms do not improve within 2 weeks after completing the 2 weeks of ciprofloxacin, please contact the office  4) schedule colonoscopy.  This will be at  main [place] with my colleague, Dr. Gonzalez.  5) check labs; you can hold off on doing this until you see your primary care physician and then do all the blood test at the same time  6) follow-up in the office in 6 months

## 2025-05-23 ENCOUNTER — PHARMACY VISIT (OUTPATIENT)
Dept: PHARMACY | Facility: CLINIC | Age: 80
End: 2025-05-23
Payer: COMMERCIAL

## 2025-05-23 ENCOUNTER — OFFICE VISIT (OUTPATIENT)
Facility: HOSPITAL | Age: 80
End: 2025-05-23
Payer: COMMERCIAL

## 2025-05-23 VITALS
TEMPERATURE: 97 F | HEIGHT: 59 IN | DIASTOLIC BLOOD PRESSURE: 84 MMHG | BODY MASS INDEX: 30.84 KG/M2 | OXYGEN SATURATION: 97 % | SYSTOLIC BLOOD PRESSURE: 182 MMHG | WEIGHT: 153 LBS | HEART RATE: 73 BPM

## 2025-05-23 DIAGNOSIS — Z78.9 NEVER SMOKED TOBACCO: ICD-10-CM

## 2025-05-23 DIAGNOSIS — T78.40XA ALLERGY, INITIAL ENCOUNTER: ICD-10-CM

## 2025-05-23 DIAGNOSIS — Z13.1 DIABETES MELLITUS SCREENING: ICD-10-CM

## 2025-05-23 DIAGNOSIS — Z13.220 LIPID SCREENING: ICD-10-CM

## 2025-05-23 DIAGNOSIS — I10 PRIMARY HYPERTENSION: Primary | ICD-10-CM

## 2025-05-23 PROBLEM — G56.00 CARPAL TUNNEL SYNDROME: Status: ACTIVE | Noted: 2025-05-23

## 2025-05-23 PROBLEM — H61.20 IMPACTED CERUMEN: Status: ACTIVE | Noted: 2025-05-23

## 2025-05-23 PROBLEM — S60.10XA SUBUNGUAL HEMATOMA OF DIGIT OF HAND: Status: ACTIVE | Noted: 2025-05-23

## 2025-05-23 PROBLEM — R92.8 ABNORMAL MAMMOGRAM: Status: ACTIVE | Noted: 2025-05-23

## 2025-05-23 PROCEDURE — RXMED WILLOW AMBULATORY MEDICATION CHARGE

## 2025-05-23 RX ORDER — AMLODIPINE BESYLATE 10 MG/1
10 TABLET ORAL DAILY
Qty: 90 TABLET | Refills: 3 | Status: SHIPPED | OUTPATIENT
Start: 2025-05-22 | End: 2026-05-22

## 2025-05-23 RX ORDER — AMLODIPINE BESYLATE 10 MG/1
10 TABLET ORAL DAILY
Qty: 30 TABLET | Refills: 0 | Status: SHIPPED | OUTPATIENT
Start: 2025-05-23 | End: 2025-05-23 | Stop reason: SDUPTHER

## 2025-05-23 RX ORDER — PYRIDOXINE HCL (VITAMIN B6) 100 MG
TABLET ORAL
COMMUNITY

## 2025-05-23 ASSESSMENT — ENCOUNTER SYMPTOMS
NECK PAIN: 0
JOINT SWELLING: 0
RHINORRHEA: 1
NECK STIFFNESS: 1
BACK PAIN: 1
CARDIOVASCULAR NEGATIVE: 1
CONSTIPATION: 1
MYALGIAS: 0
CHILLS: 0
FEVER: 0
RESPIRATORY NEGATIVE: 1

## 2025-05-23 ASSESSMENT — PAIN SCALES - GENERAL: PAINLEVEL_OUTOF10: 8

## 2025-05-23 NOTE — PROGRESS NOTES
"Subjective   Patient ID: John Deutsch is a 80 y.o. female pmhx of Crohn's disease s/p ileocolonic resection , HTN, constipation, and sensorineural hearing loss who presents for Med Refill.    HPI       Since last visit in the  clinic (October) patient reports some life stressors. Her daughter was diagnosed with cancer & undergoing treatment. Her adult son and her had to move into a new place because the last home was in such bad shape it was considered condemned. Then a family member . Then patient has been without her blood pressure medications for months. Never though to call clinic for refill.     Hypertension  BP Readings from Last 3 Encounters:   25 (!) 182/84   25 (!) 167/95   24 147/78     - meds: amlodipine 10 mg   - missing doses?: without medication for months  - taking BP at home?: no  - smoking: denies  - diet: balanced  - Consistent NSAIDs?: denies  - denies HA, chest pains, SOB, LE edema, vision changes    Ear Pain  - Chronic >6 months (hx with tinnitis)   - worse with weather changes causes pain  - has been soaking cotton balls with katie oil to help with pain  - unable to describe pain, but like an ache  - maybe associated with dry eyes & runny nose  - denies throbbing, ear discharge, fever, chills  - new place with history of dogs, unsure if she is allergic but notices the lingering smell bothers her. Does not know if ventilation has been updated    Review of Systems   Constitutional:  Negative for chills and fever.   HENT:  Positive for ear pain and rhinorrhea.    Respiratory: Negative.     Cardiovascular: Negative.    Gastrointestinal:  Positive for constipation.   Musculoskeletal:  Positive for back pain and neck stiffness. Negative for joint swelling, myalgias and neck pain.       Objective   BP (!) 182/84 (BP Location: Left arm, Patient Position: Sitting)   Pulse 73   Temp 36.1 °C (97 °F) (Temporal)   Ht 1.499 m (4' 11\")   Wt 69.4 kg (153 lb)   SpO2 97%   BMI " 30.90 kg/m²     Physical Exam  Constitutional:       General: She is not in acute distress.  HENT:      Head: Normocephalic and atraumatic.      Right Ear: Tympanic membrane normal. There is no impacted cerumen.      Left Ear: Tympanic membrane normal. There is no impacted cerumen.      Nose: Nose normal.      Mouth/Throat:      Mouth: Mucous membranes are moist.   Eyes:      Conjunctiva/sclera: Conjunctivae normal.   Cardiovascular:      Rate and Rhythm: Normal rate and regular rhythm.      Heart sounds: No murmur heard.  Pulmonary:      Effort: Pulmonary effort is normal.      Breath sounds: Normal breath sounds.   Musculoskeletal:      Right lower leg: No edema.      Left lower leg: No edema.   Skin:     General: Skin is warm and dry.      Capillary Refill: Capillary refill takes 2 to 3 seconds.   Neurological:      General: No focal deficit present.      Mental Status: She is alert.   Psychiatric:         Mood and Affect: Mood normal.         Behavior: Behavior normal.         Assessment/Plan   John Deutsch is a 80 y.o. female pmhx of Crohn's disease s/p ileocolonic resection 1999, HTN, constipation, and sensorineural hearing loss who presents for HTN follow up & Med Refill. Blood pressure not at goal d/t medications non adherence; has been out of amlodipine for months and never called to get new Rx given multiple life distractor. Additionally, patient with c/o of BL ear pain that is chronic. TM normal BL on exam. Last ENT visit noted bilateral sensorineural hearing loss. Patient recently moved into a new home that previous tenets had dogs. BL ear pain ddx include Eustachian tube dysfunction, cholesatoma, TMJ vs allergic causes from inflammation since moving into a new home. Patent can schedule to follow up with her ENT or sent out a referral to Allergy/Immunology to evaluate & treat. Detailed plan below:  Diagnoses and all orders for this visit:  Primary hypertension  -    not at goal today d/t medication non  adherence  (asymptomatic)  -   REFILLED amLODIPine (Norvasc) 10 mg tablet; Take 1 tablet (10 mg) by mouth once daily. And sent to [facility] pharmacy (previous pharmacy closed down)  -   Follow up in 3 weeks for blood pressure check  Lipid screening  -     Lipid panel; Future  Diabetes mellitus screening  -     Hemoglobin A1c; Future  Never smoked tobacco  Allergy, initial encounter  -    sx: BL ear pain, clear rhinorrhea, itchy, dry eyes  -    Evaluate for allergic rhinitis and nonsuppurative otitis media  -    Referral to Allergy; Future  Other orders  -     Follow Up In Primary Care; Future         RTC in 3 weeks for blood pressure check & to fill out [employer] form    Discussed & evaluated with Dr. Jordi Carlos,   PGY3

## 2025-05-23 NOTE — PATIENT INSTRUCTIONS
It was so great to see you today John Deutsch  . Today we discussed:    - Refilled amlodipine to [facility]  - Provided Neck & back stretches for stiffness  - Referral to Allergy/Immunology  -Follow up in 3 weeks for blood pressure check    Call [phone]  For Care if you need to schedule appointment with specialist or images.  IF any labs were ordered today, I will CALL if labs are ABNORMAL. If labs are NORMAL then I will comment on MyChart and mail results to you.    Follow up in       You were see by:    Dr. Reba Carlos D.O.  [facility]   Phone: [phone]

## 2025-05-24 LAB
ALBUMIN SERPL-MCNC: 4.6 G/DL (ref 3.6–5.1)
ALP SERPL-CCNC: 70 U/L (ref 37–153)
ALT SERPL-CCNC: 9 U/L (ref 6–29)
ANION GAP SERPL CALCULATED.4IONS-SCNC: 11 MMOL/L (CALC) (ref 7–17)
AST SERPL-CCNC: 15 U/L (ref 10–35)
BASOPHILS # BLD AUTO: 21 CELLS/UL (ref 0–200)
BASOPHILS NFR BLD AUTO: 0.4 %
BILIRUB SERPL-MCNC: 1 MG/DL (ref 0.2–1.2)
BUN SERPL-MCNC: 8 MG/DL (ref 7–25)
CALCIUM SERPL-MCNC: 9.8 MG/DL (ref 8.6–10.4)
CHLORIDE SERPL-SCNC: 106 MMOL/L (ref 98–110)
CHOLEST SERPL-MCNC: 180 MG/DL
CHOLEST/HDLC SERPL: 2.5 (CALC)
CO2 SERPL-SCNC: 26 MMOL/L (ref 20–32)
CREAT SERPL-MCNC: 0.66 MG/DL (ref 0.6–1)
CRP SERPL-MCNC: NORMAL MG/L
EGFRCR SERPLBLD CKD-EPI 2021: 90 ML/MIN/1.73M2
EOSINOPHIL # BLD AUTO: 90 CELLS/UL (ref 15–500)
EOSINOPHIL NFR BLD AUTO: 1.7 %
ERYTHROCYTE [DISTWIDTH] IN BLOOD BY AUTOMATED COUNT: 13.9 % (ref 11–15)
EST. AVERAGE GLUCOSE BLD GHB EST-MCNC: 120 MG/DL
EST. AVERAGE GLUCOSE BLD GHB EST-SCNC: 6.6 MMOL/L
GLUCOSE SERPL-MCNC: 85 MG/DL (ref 65–99)
HBA1C MFR BLD: 5.8 %
HCT VFR BLD AUTO: 40.3 % (ref 35–45)
HDLC SERPL-MCNC: 73 MG/DL
HGB BLD-MCNC: 13.1 G/DL (ref 11.7–15.5)
LDLC SERPL CALC-MCNC: 90 MG/DL (CALC)
LYMPHOCYTES # BLD AUTO: 2152 CELLS/UL (ref 850–3900)
LYMPHOCYTES NFR BLD AUTO: 40.6 %
MCH RBC QN AUTO: 30.2 PG (ref 27–33)
MCHC RBC AUTO-ENTMCNC: 32.5 G/DL (ref 32–36)
MCV RBC AUTO: 92.9 FL (ref 80–100)
MONOCYTES # BLD AUTO: 392 CELLS/UL (ref 200–950)
MONOCYTES NFR BLD AUTO: 7.4 %
NEUTROPHILS # BLD AUTO: 2645 CELLS/UL (ref 1500–7800)
NEUTROPHILS NFR BLD AUTO: 49.9 %
NONHDLC SERPL-MCNC: 107 MG/DL (CALC)
PLATELET # BLD AUTO: 299 THOUSAND/UL (ref 140–400)
PMV BLD REES-ECKER: 10.7 FL (ref 7.5–12.5)
POTASSIUM SERPL-SCNC: 3.9 MMOL/L (ref 3.5–5.3)
PROT SERPL-MCNC: 7.8 G/DL (ref 6.1–8.1)
RBC # BLD AUTO: 4.34 MILLION/UL (ref 3.8–5.1)
SODIUM SERPL-SCNC: 143 MMOL/L (ref 135–146)
TRIGL SERPL-MCNC: 83 MG/DL
WBC # BLD AUTO: 5.3 THOUSAND/UL (ref 3.8–10.8)

## 2025-05-27 LAB
ALBUMIN SERPL-MCNC: 4.6 G/DL (ref 3.6–5.1)
ALP SERPL-CCNC: 70 U/L (ref 37–153)
ALT SERPL-CCNC: 9 U/L (ref 6–29)
ANION GAP SERPL CALCULATED.4IONS-SCNC: 11 MMOL/L (CALC) (ref 7–17)
AST SERPL-CCNC: 15 U/L (ref 10–35)
BASOPHILS # BLD AUTO: 21 CELLS/UL (ref 0–200)
BASOPHILS NFR BLD AUTO: 0.4 %
BILIRUB SERPL-MCNC: 1 MG/DL (ref 0.2–1.2)
BUN SERPL-MCNC: 8 MG/DL (ref 7–25)
CALCIUM SERPL-MCNC: 9.8 MG/DL (ref 8.6–10.4)
CHLORIDE SERPL-SCNC: 106 MMOL/L (ref 98–110)
CO2 SERPL-SCNC: 26 MMOL/L (ref 20–32)
CREAT SERPL-MCNC: 0.66 MG/DL (ref 0.6–1)
CRP SERPL-MCNC: 8.9 MG/L
EGFRCR SERPLBLD CKD-EPI 2021: 90 ML/MIN/1.73M2
EOSINOPHIL # BLD AUTO: 90 CELLS/UL (ref 15–500)
EOSINOPHIL NFR BLD AUTO: 1.7 %
ERYTHROCYTE [DISTWIDTH] IN BLOOD BY AUTOMATED COUNT: 13.9 % (ref 11–15)
GLUCOSE SERPL-MCNC: 85 MG/DL (ref 65–99)
HCT VFR BLD AUTO: 40.3 % (ref 35–45)
HGB BLD-MCNC: 13.1 G/DL (ref 11.7–15.5)
LYMPHOCYTES # BLD AUTO: 2152 CELLS/UL (ref 850–3900)
LYMPHOCYTES NFR BLD AUTO: 40.6 %
MCH RBC QN AUTO: 30.2 PG (ref 27–33)
MCHC RBC AUTO-ENTMCNC: 32.5 G/DL (ref 32–36)
MCV RBC AUTO: 92.9 FL (ref 80–100)
MONOCYTES # BLD AUTO: 392 CELLS/UL (ref 200–950)
MONOCYTES NFR BLD AUTO: 7.4 %
NEUTROPHILS # BLD AUTO: 2645 CELLS/UL (ref 1500–7800)
NEUTROPHILS NFR BLD AUTO: 49.9 %
PLATELET # BLD AUTO: 299 THOUSAND/UL (ref 140–400)
PMV BLD REES-ECKER: 10.7 FL (ref 7.5–12.5)
POTASSIUM SERPL-SCNC: 3.9 MMOL/L (ref 3.5–5.3)
PROT SERPL-MCNC: 7.8 G/DL (ref 6.1–8.1)
RBC # BLD AUTO: 4.34 MILLION/UL (ref 3.8–5.1)
SODIUM SERPL-SCNC: 143 MMOL/L (ref 135–146)
WBC # BLD AUTO: 5.3 THOUSAND/UL (ref 3.8–10.8)

## 2025-06-13 ENCOUNTER — OFFICE VISIT (OUTPATIENT)
Facility: HOSPITAL | Age: 80
End: 2025-06-13
Payer: COMMERCIAL

## 2025-06-13 VITALS
HEIGHT: 59 IN | SYSTOLIC BLOOD PRESSURE: 133 MMHG | DIASTOLIC BLOOD PRESSURE: 81 MMHG | TEMPERATURE: 96 F | BODY MASS INDEX: 31.04 KG/M2 | OXYGEN SATURATION: 96 % | WEIGHT: 154 LBS | HEART RATE: 91 BPM

## 2025-06-13 DIAGNOSIS — I10 PRIMARY HYPERTENSION: Primary | ICD-10-CM

## 2025-06-13 ASSESSMENT — PAIN SCALES - GENERAL: PAINLEVEL_OUTOF10: 0-NO PAIN

## 2025-06-13 ASSESSMENT — ENCOUNTER SYMPTOMS
RESPIRATORY NEGATIVE: 1
CARDIOVASCULAR NEGATIVE: 1
CONSTITUTIONAL NEGATIVE: 1

## 2025-06-13 NOTE — PROGRESS NOTES
"Subjective   Patient ID: John Deutsch is a 80 y.o. female who presents for Follow-up (Follow up for BP).    HPI     Hypertension  BP Readings from Last 3 Encounters:   06/13/25 133/81   05/23/25 (!) 182/84   05/20/25 (!) 167/95     - meds: amldopine 5mg daily  - missing doses?: denies  - taking BP at home?: no, has no cuff  - smoking: denies  - diet: Balanced  - Consistent NSAIDs?: no  - denies HA, chest pains, SOB, LE edema, vision changes      Review of Systems   Constitutional: Negative.    HENT: Negative.     Respiratory: Negative.     Cardiovascular: Negative.          Objective   /81   Pulse 91   Temp 35.6 °C (96 °F) (Temporal)   Ht 1.499 m (4' 11\")   Wt 69.9 kg (154 lb)   SpO2 96%   BMI 31.10 kg/m²     Physical Exam  Constitutional:       General: She is not in acute distress.  HENT:      Head: Normocephalic and atraumatic.      Nose: Nose normal.   Eyes:      Conjunctiva/sclera: Conjunctivae normal.   Cardiovascular:      Rate and Rhythm: Normal rate and regular rhythm.      Heart sounds: No murmur heard.  Pulmonary:      Effort: Pulmonary effort is normal.      Breath sounds: Normal breath sounds.   Musculoskeletal:      Right lower leg: No edema.      Left lower leg: No edema.   Skin:     General: Skin is warm and dry.   Neurological:      General: No focal deficit present.      Mental Status: She is alert.   Psychiatric:         Mood and Affect: Mood normal.         Behavior: Behavior normal.         Assessment/Plan   John Deutsch is a 80 y.o. female pmhx of Crohn's disease s/p ileocolonic resection 1999, HTN, constipation, and sensorineural hearing loss who presents for HTN follow up.   Diagnoses and all orders for this visit:  Primary hypertension       - BP at goal of <140/90       -   continue with amlodipine 10 mg daily       -   Encouraged Dash Diet       -  Ordered Blood Pressure Monitoring Kit Medicare Form       -  Discussed proper techniques to check blood pressure  Other orders  - "     Follow Up In Primary Care     RTC in 3 months for follow up & establish with new physician in clinic      Discussed with Dr. Jordi Carlos,  FM PGY3

## 2025-06-23 ENCOUNTER — ANESTHESIA EVENT (OUTPATIENT)
Dept: GASTROENTEROLOGY | Facility: HOSPITAL | Age: 80
End: 2025-06-23
Payer: COMMERCIAL

## 2025-06-23 ENCOUNTER — ANESTHESIA (OUTPATIENT)
Dept: GASTROENTEROLOGY | Facility: HOSPITAL | Age: 80
End: 2025-06-23
Payer: COMMERCIAL

## 2025-06-23 ENCOUNTER — HOSPITAL ENCOUNTER (OUTPATIENT)
Dept: GASTROENTEROLOGY | Facility: HOSPITAL | Age: 80
Discharge: HOME | End: 2025-06-23
Payer: COMMERCIAL

## 2025-06-23 VITALS
TEMPERATURE: 96.8 F | RESPIRATION RATE: 13 BRPM | HEART RATE: 62 BPM | SYSTOLIC BLOOD PRESSURE: 148 MMHG | OXYGEN SATURATION: 98 % | DIASTOLIC BLOOD PRESSURE: 74 MMHG | HEIGHT: 59 IN | WEIGHT: 154 LBS | BODY MASS INDEX: 31.04 KG/M2

## 2025-06-23 DIAGNOSIS — K50.90 CROHN'S DISEASE WITHOUT COMPLICATION, UNSPECIFIED GASTROINTESTINAL TRACT LOCATION: ICD-10-CM

## 2025-06-23 PROCEDURE — 45380 COLONOSCOPY AND BIOPSY: CPT | Performed by: INTERNAL MEDICINE

## 2025-06-23 PROCEDURE — 7100000009 HC PHASE TWO TIME - INITIAL BASE CHARGE

## 2025-06-23 PROCEDURE — 99100 ANES PT EXTEME AGE<1 YR&>70: CPT | Performed by: ANESTHESIOLOGY

## 2025-06-23 PROCEDURE — 3700000001 HC GENERAL ANESTHESIA TIME - INITIAL BASE CHARGE

## 2025-06-23 PROCEDURE — 2500000004 HC RX 250 GENERAL PHARMACY W/ HCPCS (ALT 636 FOR OP/ED)

## 2025-06-23 PROCEDURE — 3700000002 HC GENERAL ANESTHESIA TIME - EACH INCREMENTAL 1 MINUTE

## 2025-06-23 PROCEDURE — A45380 PR COLONOSCOPY,BIOPSY: Performed by: ANESTHESIOLOGY

## 2025-06-23 PROCEDURE — 7100000010 HC PHASE TWO TIME - EACH INCREMENTAL 1 MINUTE

## 2025-06-23 RX ORDER — PROPOFOL 10 MG/ML
INJECTION, EMULSION INTRAVENOUS CONTINUOUS PRN
Status: DISCONTINUED | OUTPATIENT
Start: 2025-06-23 | End: 2025-06-23

## 2025-06-23 RX ORDER — FENTANYL CITRATE 50 UG/ML
25 INJECTION, SOLUTION INTRAMUSCULAR; INTRAVENOUS EVERY 5 MIN PRN
Status: DISCONTINUED | OUTPATIENT
Start: 2025-06-23 | End: 2025-06-24 | Stop reason: HOSPADM

## 2025-06-23 RX ORDER — ONDANSETRON HYDROCHLORIDE 2 MG/ML
4 INJECTION, SOLUTION INTRAVENOUS ONCE AS NEEDED
Status: DISCONTINUED | OUTPATIENT
Start: 2025-06-23 | End: 2025-06-24 | Stop reason: HOSPADM

## 2025-06-23 RX ORDER — ALBUTEROL SULFATE 0.83 MG/ML
2.5 SOLUTION RESPIRATORY (INHALATION) ONCE AS NEEDED
Status: DISCONTINUED | OUTPATIENT
Start: 2025-06-23 | End: 2025-06-24 | Stop reason: HOSPADM

## 2025-06-23 RX ORDER — ACETAMINOPHEN 325 MG/1
650 TABLET ORAL EVERY 4 HOURS PRN
Status: DISCONTINUED | OUTPATIENT
Start: 2025-06-23 | End: 2025-06-24 | Stop reason: HOSPADM

## 2025-06-23 RX ORDER — SODIUM CHLORIDE, SODIUM LACTATE, POTASSIUM CHLORIDE, CALCIUM CHLORIDE 600; 310; 30; 20 MG/100ML; MG/100ML; MG/100ML; MG/100ML
100 INJECTION, SOLUTION INTRAVENOUS CONTINUOUS
Status: DISCONTINUED | OUTPATIENT
Start: 2025-06-23 | End: 2025-06-24 | Stop reason: HOSPADM

## 2025-06-23 RX ORDER — LIDOCAINE IN NACL,ISO-OSMOT/PF 30 MG/3 ML
0.1 SYRINGE (ML) INJECTION ONCE
Status: DISCONTINUED | OUTPATIENT
Start: 2025-06-23 | End: 2025-06-24 | Stop reason: HOSPADM

## 2025-06-23 RX ORDER — METOCLOPRAMIDE HYDROCHLORIDE 5 MG/ML
10 INJECTION INTRAMUSCULAR; INTRAVENOUS ONCE AS NEEDED
Status: DISCONTINUED | OUTPATIENT
Start: 2025-06-23 | End: 2025-06-24 | Stop reason: HOSPADM

## 2025-06-23 RX ADMIN — PROPOFOL 10 MG: 10 INJECTION, EMULSION INTRAVENOUS at 11:06

## 2025-06-23 RX ADMIN — PROPOFOL 200 MCG/KG/MIN: 10 INJECTION, EMULSION INTRAVENOUS at 11:04

## 2025-06-23 RX ADMIN — PROPOFOL 40 MG: 10 INJECTION, EMULSION INTRAVENOUS at 11:05

## 2025-06-23 SDOH — HEALTH STABILITY: MENTAL HEALTH: CURRENT SMOKER: 0

## 2025-06-23 ASSESSMENT — PAIN SCALES - GENERAL
PAIN_LEVEL: 0
PAINLEVEL_OUTOF10: 0 - NO PAIN
PAINLEVEL_OUTOF10: 0 - NO PAIN

## 2025-06-23 ASSESSMENT — COLUMBIA-SUICIDE SEVERITY RATING SCALE - C-SSRS: 1. IN THE PAST MONTH, HAVE YOU WISHED YOU WERE DEAD OR WISHED YOU COULD GO TO SLEEP AND NOT WAKE UP?: NO

## 2025-06-23 ASSESSMENT — PAIN - FUNCTIONAL ASSESSMENT
PAIN_FUNCTIONAL_ASSESSMENT: 0-10
PAIN_FUNCTIONAL_ASSESSMENT: 0-10

## 2025-06-23 NOTE — DISCHARGE INSTRUCTIONS
During the first 24 hours after your procedure, you should:    - Resume normal diet, unless otherwise directed by your doctor.  - Resume your home medications, unless otherwise directed by your doctor.  - Refrain from driving or operative heavy machinery.  - Drink plenty of liquids.  - Avoid consuming alcohol.  - Avoid strenuous activity or heavy lifting.    After 24 hours, you can resume regular activity.    Call your doctor office immediately ([phone]) or come to the nearest emergency room if you experience:    - Abdominal tenderness  - Blood in your stool or vomit  - Difficulty urinating or passing stools  - Difficulty breathing  - Chest pain  - Fever

## 2025-06-23 NOTE — H&P
"History Of Present Illness  John Deutsch is a 80 y.o. female with a history of Crohn's disease s/p ileocolonic resection 1999 with strictures in the ileocolonic anastomosis and has had multiple EUAs with anal dilation.     During her last colonoscopy in 2022, she had an anal stricture that needed to be dilated with index finger, and in a prior colonoscopy in 2019, she needed balloon dilation of the stricture in the ileocolonic anastomosis.     She has also been empirically been treated for SIBO recently in May 2025 by her IBD specialist Dr. Gutierrez.     She is currently not on any advanced therapies for her Crohn's disease.      Past Medical History  Medical History[1]    Surgical History  Surgical History[2]    Social History  She reports that she has never smoked. She has never used smokeless tobacco. She reports that she does not drink alcohol and does not use drugs.    Family History  Family History[3]     Allergies  Allergies[4]    Review of Systems  A 12 point ROS was performed and is negative except for HPI above.      Physical Exam  General: not in acute distress  CV: regular rate and rhythm, no murmur  Resp: clear to auscultation bilaterally  GI: soft, active bowel sounds, non-tender to palpation, no rebound or guarding, no ascites  Msk: no lower extremity edema  Derm: no jaundice      Last Recorded Vitals  Blood pressure 129/81, pulse 83, temperature 36.1 °C (96.9 °F), temperature source Temporal, resp. rate 16, height 1.499 m (4' 11\"), weight 69.9 kg (154 lb), SpO2 98%.    Assessment/Plan   John Deutsch is a 80 y.o. female with a history of Crohn's disease s/p ileocolonic resection 1999 with strictures in the ileocolonic anastomosis and has had multiple EUAs with anal dilation in the past with Dr. Vivek Foley.     Plan for colonoscopy today under AA.     Discussed with Dr. Gonzalez.      Debby Menendez MD MPH  GI Fellow  [facility]        [1]   Past Medical History:  Diagnosis Date    Age-related nuclear " cataract, bilateral 2016    Age-related nuclear cataract of both eyes    Age-related nuclear cataract, unspecified eye 2016    Nuclear sclerosis    Combined forms of age-related cataract, right eye 2022    Combined form of age-related cataract, right eye    Crohn's disease, unspecified, without complications (Multi) 2016    Crohn disease    Crohn's disease, unspecified, without complications (Multi) 2013    Crohn's disease    Drusen (degenerative) of macula, bilateral 2016    Drusen (degenerative) of macula, bilateral    Dry eye syndrome of unspecified lacrimal gland 2016    Dry eye syndrome    Encounter for general adult medical examination without abnormal findings 2022    Medicare annual wellness visit, subsequent    Nonexudative age-related macular degeneration, bilateral, stage unspecified 2016    Age-related macular degeneration, dry, both eyes    Other abnormal and inconclusive findings on diagnostic imaging of breast 2013    Mammogram abnormal    Other conditions influencing health status     Congenital Stenosis Of Large Intestine    Other specified noninflammatory disorders of uterus     Fibrosis of uterus    Personal history of diseases of the skin and subcutaneous tissue 2014    History of urticaria    Personal history of other diseases of the respiratory system 2014    History of acute bronchitis    Personal history of urinary (tract) infections 2014    History of urinary tract infection    Presbyopia 2016    Presbyopia OU    Presence of intraocular lens 2022    Pseudophakia of left eye    Stenosis of anus and rectum     Rectal stricture    Unspecified astigmatism, bilateral 2016    Astigmatism, bilateral   [2]   Past Surgical History:  Procedure Laterality Date     SECTION, CLASSIC  10/02/2013     Section    COLONOSCOPY  10/02/2013    Complete Colonoscopy    OTHER SURGICAL HISTORY  10/02/2013     Colonoscopy (Fiberoptic) Forceps Biopsy    OTHER SURGICAL HISTORY  10/02/2013    Proctosigmoidoscopy (Rigid - Diagnostic)    OTHER SURGICAL HISTORY  10/02/2013    Ileoanal Pouch Fistula Repair Transperin & Transabd Approach   [3]   Family History  Problem Relation Name Age of Onset    Breast cancer Sister      Stroke Brother      Diabetes Daughter      Obesity Son      Diabetes type II Son      Crohn's disease Other FAMILY HISTORY    [4]   Allergies  Allergen Reactions    Hydrochlorothiazide Unknown    Iodinated Contrast Media Dizziness and Syncope     PASSED OUT    Iodine Unknown    Lisinopril Angioedema    Oxycodone-Aspirin Unknown    Oxycodone Palpitations, Other and Dizziness     CONFUSION  TACHYCARDIA

## 2025-06-23 NOTE — ANESTHESIA POSTPROCEDURE EVALUATION
Patient: John Deutsch    Procedure Summary       Date: 06/23/25 Room / Location: [facility]    Anesthesia Start: 1059 Anesthesia Stop: 1156    Procedure: COLONOSCOPY Diagnosis: Crohn's disease without complication, unspecified gastrointestinal tract location    Scheduled Providers: Dom Gonzalez MD; Patricia Deutsch RN Responsible Provider: Luis Metcalf MD    Anesthesia Type: MAC ASA Status: 3            Anesthesia Type: MAC    Vitals Value Taken Time   /84 06/23/25 11:51   Temp 36 °C (96.8 °F) 06/23/25 11:51   Pulse 72 06/23/25 11:51   Resp 18 06/23/25 11:51   SpO2 100 % 06/23/25 11:51       Anesthesia Post Evaluation    Patient location during evaluation: PACU  Patient participation: complete - patient participated  Level of consciousness: awake and alert  Pain score: 0  Pain management: adequate  Airway patency: patent  Cardiovascular status: acceptable  Respiratory status: acceptable  Hydration status: acceptable  Postoperative Nausea and Vomiting: none        There were no known notable events for this encounter.

## 2025-06-23 NOTE — SIGNIFICANT EVENT
"After getting herself dressed, pt attempted to open the curtain in recovery bay 4 and lost her balance and fell. This nurse along w/another staff member immediately ran over to the pt to assess for any injuries. Pt stated \"I didn't have any traction on my shoes\". Pt denies hitting her head, and there was no loss of consciousness. The patient was picked up off the floor, vitals are as follows: 98 RA, 73 /7, 18 RR, 5/10 buttocks pain. Dr. Gonzalez notified. Pt was advised to the ED, but pt stated \"I am okay, and don't need the ED\". Pt again advised if her pain should increase or symptoms worsen to go to the nearest emergency department. Pt was wheeled out of the gi lab, denies sob, cp, lightheadedness.  "

## 2025-06-23 NOTE — ANESTHESIA PREPROCEDURE EVALUATION
Patient: John Deutsch    Procedure Information       Date/Time: 25 1000    Scheduled providers: Dom Gonzalez MD; Patricia Deutsch RN    Procedure: COLONOSCOPY    Location: [facility]            Relevant Problems   Anesthesia   (+) Broken tooth      Cardiac   (+) Chest wall pain   (+) Hypertension      Pulmonary   (+) Shortness of breath on exertion      Neuro   (+) Carpal tunnel syndrome   (+) Carpal tunnel syndrome, bilateral   (+) Cervical radiculopathy   (+) Dysthymia   (+) Ulnar neuropathy at elbow      GI   (+) Crohn's disease (Multi)   (+) Dysphagia   (+) GERD (gastroesophageal reflux disease)      Musculoskeletal   (+) Carpal tunnel syndrome   (+) Carpal tunnel syndrome, bilateral   (+) Chronic bilateral low back pain without sciatica      HEENT   (+) Asymmetric SNHL (sensorineural hearing loss)   (+) Hearing loss      ID   (+) Small intestinal bacterial overgrowth   (+) Verrucae vulgaris   (+) Viral wart, unspecified      Skin   (+) Lichen planopilaris   (+) Rash      GYN   (+) Leiomyoma of uterus       Clinical information reviewed:   Tobacco  Allergies  Meds   Med Hx  Surg Hx   Fam Hx  Soc Hx        NPO Detail:  NPO/Void Status  Date of Last Liquid: 25  Time of Last Liquid: 2300  Date of Last Solid: 25  Time of Last Solid: 1530  Last Intake Type: Clear fluids  Time of Last Void: 0842         Physical Exam    Airway  Mallampati: II  TM distance: >3 FB  Neck ROM: limited  Mouth openin finger widths     Cardiovascular - normal exam  Rhythm: regular  Rate: normal     Dental        Pulmonary - normal examBreath sounds clear to auscultation     Abdominal - normal examAbdomen: soft             Anesthesia Plan    History of general anesthesia?: yes  History of complications of general anesthesia?: no    ASA 3     MAC   (    Risks discussed to Patient's satisfaction  )  The patient is not a current smoker.    intravenous induction   Anesthetic plan and risks discussed  with patient.    Plan discussed with CAA.

## 2025-06-27 LAB
LABORATORY COMMENT REPORT: NORMAL
PATH REPORT.FINAL DX SPEC: NORMAL
PATH REPORT.GROSS SPEC: NORMAL
PATH REPORT.TOTAL CANCER: NORMAL

## 2025-07-31 ENCOUNTER — TELEPHONE (OUTPATIENT)
Dept: GASTROENTEROLOGY | Facility: HOSPITAL | Age: 80
End: 2025-07-31
Payer: COMMERCIAL

## 2025-07-31 DIAGNOSIS — K59.01 SLOW TRANSIT CONSTIPATION: Primary | ICD-10-CM

## 2025-07-31 RX ORDER — POLYETHYLENE GLYCOL 3350 17 G/17G
POWDER, FOR SOLUTION ORAL
Qty: 30 PACKET | Refills: 1 | Status: SHIPPED | OUTPATIENT
Start: 2025-07-31

## 2025-08-21 PROCEDURE — RXMED WILLOW AMBULATORY MEDICATION CHARGE

## 2025-08-22 ENCOUNTER — PHARMACY VISIT (OUTPATIENT)
Dept: PHARMACY | Facility: CLINIC | Age: 80
End: 2025-08-22
Payer: COMMERCIAL

## 2026-01-09 ENCOUNTER — APPOINTMENT (OUTPATIENT)
Dept: OPHTHALMOLOGY | Facility: CLINIC | Age: 81
End: 2026-01-09
Payer: COMMERCIAL

## (undated) RX ORDER — LIDOCAINE HYDROCHLORIDE AND EPINEPHRINE 10; 10 MG/ML; UG/ML
INJECTION, SOLUTION INFILTRATION; PERINEURAL
Status: DISPENSED
Start: 2017-05-30